# Patient Record
Sex: FEMALE | Race: WHITE | NOT HISPANIC OR LATINO
[De-identification: names, ages, dates, MRNs, and addresses within clinical notes are randomized per-mention and may not be internally consistent; named-entity substitution may affect disease eponyms.]

---

## 2019-01-25 ENCOUNTER — RESULT REVIEW (OUTPATIENT)
Age: 24
End: 2019-01-25

## 2019-05-13 ENCOUNTER — APPOINTMENT (OUTPATIENT)
Dept: DERMATOLOGY | Facility: CLINIC | Age: 24
End: 2019-05-13

## 2020-02-05 ENCOUNTER — RESULT REVIEW (OUTPATIENT)
Age: 25
End: 2020-02-05

## 2020-10-28 ENCOUNTER — OUTPATIENT (OUTPATIENT)
Dept: OUTPATIENT SERVICES | Facility: HOSPITAL | Age: 25
LOS: 1 days | Discharge: HOME | End: 2020-10-28

## 2020-10-28 DIAGNOSIS — Z31.5 ENCOUNTER FOR PROCREATIVE GENETIC COUNSELING: ICD-10-CM

## 2020-11-04 ENCOUNTER — LABORATORY RESULT (OUTPATIENT)
Age: 25
End: 2020-11-04

## 2020-11-04 ENCOUNTER — APPOINTMENT (OUTPATIENT)
Dept: HEMATOLOGY ONCOLOGY | Facility: CLINIC | Age: 25
End: 2020-11-04
Payer: COMMERCIAL

## 2020-11-04 PROCEDURE — 99499A: CUSTOM | Mod: NC

## 2020-11-04 PROCEDURE — 99072 ADDL SUPL MATRL&STAF TM PHE: CPT

## 2020-11-24 ENCOUNTER — APPOINTMENT (OUTPATIENT)
Dept: HEMATOLOGY ONCOLOGY | Facility: CLINIC | Age: 25
End: 2020-11-24

## 2020-12-11 ENCOUNTER — OUTPATIENT (OUTPATIENT)
Dept: OUTPATIENT SERVICES | Facility: HOSPITAL | Age: 25
LOS: 1 days | Discharge: HOME | End: 2020-12-11

## 2020-12-11 DIAGNOSIS — Z31.5 ENCOUNTER FOR PROCREATIVE GENETIC COUNSELING: ICD-10-CM

## 2020-12-16 ENCOUNTER — APPOINTMENT (OUTPATIENT)
Dept: HEMATOLOGY ONCOLOGY | Facility: CLINIC | Age: 25
End: 2020-12-16
Payer: COMMERCIAL

## 2020-12-16 ENCOUNTER — APPOINTMENT (OUTPATIENT)
Dept: HEMATOLOGY ONCOLOGY | Facility: CLINIC | Age: 25
End: 2020-12-16

## 2020-12-16 PROCEDURE — 99205 OFFICE O/P NEW HI 60 MIN: CPT | Mod: 95

## 2020-12-16 PROCEDURE — ZZZZZ: CPT | Mod: NC

## 2021-01-13 ENCOUNTER — APPOINTMENT (OUTPATIENT)
Dept: SURGICAL ONCOLOGY | Facility: CLINIC | Age: 26
End: 2021-01-13
Payer: COMMERCIAL

## 2021-01-13 VITALS
OXYGEN SATURATION: 97 % | DIASTOLIC BLOOD PRESSURE: 72 MMHG | BODY MASS INDEX: 28.35 KG/M2 | SYSTOLIC BLOOD PRESSURE: 108 MMHG | HEART RATE: 64 BPM | HEIGHT: 63 IN | WEIGHT: 160 LBS

## 2021-01-13 DIAGNOSIS — Z78.9 OTHER SPECIFIED HEALTH STATUS: ICD-10-CM

## 2021-01-13 DIAGNOSIS — Z86.59 PERSONAL HISTORY OF OTHER MENTAL AND BEHAVIORAL DISORDERS: ICD-10-CM

## 2021-01-13 PROCEDURE — 99244 OFF/OP CNSLTJ NEW/EST MOD 40: CPT

## 2021-01-13 PROCEDURE — 99072 ADDL SUPL MATRL&STAF TM PHE: CPT

## 2021-01-14 PROBLEM — Z86.59 HISTORY OF ANXIETY: Status: RESOLVED | Noted: 2021-01-13 | Resolved: 2021-01-14

## 2021-01-14 PROBLEM — Z86.59 HISTORY OF DEPRESSION: Status: RESOLVED | Noted: 2021-01-13 | Resolved: 2021-01-14

## 2021-01-14 PROBLEM — Z78.9 NON-SMOKER: Status: ACTIVE | Noted: 2021-01-13

## 2021-01-14 PROBLEM — Z78.9 RARELY CONSUMES ALCOHOL: Status: ACTIVE | Noted: 2021-01-13

## 2021-01-14 NOTE — ASSESSMENT
[FreeTextEntry1] : IMP.\par No evidence of new or recurrent lesions.\par Breast imaging failed to identify any suspicious lesions.\par No suspicious lesions on exam.\par The patient and I discussed the surgical management of breast cancer. I explained that breast cancer can be treated with 2 main surgical approaches. One is breast conserving therapy. The other is mastectomy with or without immediate reconstruction by a plastic surgeon. Breast conserving therapy involves wide excision of the involved area. Negative margins must be achieved with this wide excision. In addition, evaluation of the lymph nodes either with a sentinel lymph node biopsy or an axillary lymph node dissection may be required. This treatment usually requires postoperative radiation to the breast. Treatment with mastectomy also involves evaluation of the lymph node with a sentinel lymph node biopsy or axillary lymph node dissection. Post operative radiation therapy may be needed even after mastectomy in certain advanced cases. \par \par Plan:\par Pt with BRCA 2 gene considering prophylactic mastectomy without breast reconstruction. Will refer Pt over to Dr. Crain for reconstruction. \par \par

## 2021-01-14 NOTE — ADDENDUM
[FreeTextEntry1] : I, Edd Blair, acted solely as a scribe for Dr. Jerrod Osuna on this date 1/13/2021

## 2021-01-14 NOTE — HISTORY OF PRESENT ILLNESS
[de-identified] : Pt is a 24 y/o F presenting today as an initial consultation. \par \par Her most recent genetic test completed on 12/16/20 results as BRCA2 positive. \par \par She has FHx of breast cancer.  \par  \par She has no PMHx of ETOH/Tobacco usage and no present medical conditions . She is currently taking trazodone and citalopram. \par

## 2021-01-14 NOTE — PHYSICAL EXAM
[Normal] : supple, no neck mass and thyroid not enlarged [Normal Neck Lymph Nodes] : normal neck lymph nodes  [Normal Supraclavicular Lymph Nodes] : normal supraclavicular lymph nodes [Normal Groin Lymph Nodes] : normal groin lymph nodes [Normal Axillary Lymph Nodes] : normal axillary lymph nodes [Normal] : oriented to person, place and time, with appropriate affect [FreeTextEntry1] : KN present during exam \par COVID -19 precautions as per [facility] policy was universally followed.  [de-identified] : S1, S2 - Normal rhythm  [de-identified] : Complete normal breast examination performed supine and upright revealed no palpable masses, nipple discharge, inversion, deviation, or enlarge axillary lymph nodes, or supraclavicular lymph nodes.  [de-identified] : Clear breath sounds bilaterally, normal resp. rate

## 2021-01-21 ENCOUNTER — APPOINTMENT (OUTPATIENT)
Dept: PLASTIC SURGERY | Facility: CLINIC | Age: 26
End: 2021-01-21
Payer: COMMERCIAL

## 2021-01-21 VITALS
HEIGHT: 63 IN | TEMPERATURE: 98.7 F | WEIGHT: 160 LBS | OXYGEN SATURATION: 96 % | SYSTOLIC BLOOD PRESSURE: 118 MMHG | BODY MASS INDEX: 28.35 KG/M2 | DIASTOLIC BLOOD PRESSURE: 77 MMHG | HEART RATE: 85 BPM

## 2021-01-21 PROCEDURE — 99244 OFF/OP CNSLTJ NEW/EST MOD 40: CPT

## 2021-01-21 PROCEDURE — 99072 ADDL SUPL MATRL&STAF TM PHE: CPT

## 2021-02-10 ENCOUNTER — RESULT REVIEW (OUTPATIENT)
Age: 26
End: 2021-02-10

## 2021-02-11 NOTE — REASON FOR VISIT
[Initial Evaluation] : an initial evaluation [Family Member] : family member [FreeTextEntry1] : Dr. Jerrod Osuna (Surgical Oncology)

## 2021-02-11 NOTE — CONSULT LETTER
[Dear  ___] : Dear  [unfilled], [Consult Letter:] : I had the pleasure of evaluating your patient, [unfilled]. [Please see my note below.] : Please see my note below. [Consult Closing:] : Thank you very much for allowing me to participate in the care of this patient.  If you have any questions, please do not hesitate to contact me. [Sincerely,] : Sincerely, [FreeTextEntry3] : Praveen Crain MD

## 2021-02-18 ENCOUNTER — APPOINTMENT (OUTPATIENT)
Dept: PLASTIC SURGERY | Facility: CLINIC | Age: 26
End: 2021-02-18
Payer: COMMERCIAL

## 2021-02-18 VITALS
HEART RATE: 77 BPM | OXYGEN SATURATION: 96 % | WEIGHT: 170 LBS | BODY MASS INDEX: 30.12 KG/M2 | SYSTOLIC BLOOD PRESSURE: 104 MMHG | HEIGHT: 63 IN | DIASTOLIC BLOOD PRESSURE: 62 MMHG

## 2021-02-18 PROCEDURE — 99212 OFFICE O/P EST SF 10 MIN: CPT

## 2021-02-18 PROCEDURE — XXXXX: CPT

## 2021-02-22 ENCOUNTER — NON-APPOINTMENT (OUTPATIENT)
Age: 26
End: 2021-02-22

## 2021-02-23 ENCOUNTER — OUTPATIENT (OUTPATIENT)
Dept: OUTPATIENT SERVICES | Facility: HOSPITAL | Age: 26
LOS: 1 days | End: 2021-02-23

## 2021-02-23 VITALS
OXYGEN SATURATION: 98 % | SYSTOLIC BLOOD PRESSURE: 110 MMHG | DIASTOLIC BLOOD PRESSURE: 80 MMHG | RESPIRATION RATE: 16 BRPM | HEIGHT: 63 IN | WEIGHT: 173.94 LBS | TEMPERATURE: 99 F | HEART RATE: 76 BPM

## 2021-02-23 DIAGNOSIS — Z92.89 PERSONAL HISTORY OF OTHER MEDICAL TREATMENT: Chronic | ICD-10-CM

## 2021-02-23 DIAGNOSIS — F41.9 ANXIETY DISORDER, UNSPECIFIED: ICD-10-CM

## 2021-02-23 DIAGNOSIS — Z15.01 GENETIC SUSCEPTIBILITY TO MALIGNANT NEOPLASM OF BREAST: ICD-10-CM

## 2021-02-23 LAB
BLD GP AB SCN SERPL QL: NEGATIVE — SIGNIFICANT CHANGE UP
HCG UR QL: NEGATIVE — SIGNIFICANT CHANGE UP
HCT VFR BLD CALC: 42 % — SIGNIFICANT CHANGE UP (ref 34.5–45)
HGB BLD-MCNC: 13.4 G/DL — SIGNIFICANT CHANGE UP (ref 11.5–15.5)
MCHC RBC-ENTMCNC: 26.7 PG — LOW (ref 27–34)
MCHC RBC-ENTMCNC: 31.9 GM/DL — LOW (ref 32–36)
MCV RBC AUTO: 83.8 FL — SIGNIFICANT CHANGE UP (ref 80–100)
NRBC # BLD: 0 /100 WBCS — SIGNIFICANT CHANGE UP
NRBC # FLD: 0 K/UL — SIGNIFICANT CHANGE UP
PLATELET # BLD AUTO: 362 K/UL — SIGNIFICANT CHANGE UP (ref 150–400)
RBC # BLD: 5.01 M/UL — SIGNIFICANT CHANGE UP (ref 3.8–5.2)
RBC # FLD: 13.2 % — SIGNIFICANT CHANGE UP (ref 10.3–14.5)
RH IG SCN BLD-IMP: POSITIVE — SIGNIFICANT CHANGE UP
WBC # BLD: 7.97 K/UL — SIGNIFICANT CHANGE UP (ref 3.8–10.5)
WBC # FLD AUTO: 7.97 K/UL — SIGNIFICANT CHANGE UP (ref 3.8–10.5)

## 2021-02-23 RX ORDER — SODIUM CHLORIDE 9 MG/ML
1000 INJECTION, SOLUTION INTRAVENOUS
Refills: 0 | Status: DISCONTINUED | OUTPATIENT
Start: 2021-03-01 | End: 2021-03-01

## 2021-02-23 NOTE — H&P PST ADULT - NSANTHOSAYNRD_GEN_A_CORE
No. MELLISA screening performed.  STOP BANG Legend: 0-2 = LOW Risk; 3-4 = INTERMEDIATE Risk; 5-8 = HIGH Risk

## 2021-02-23 NOTE — H&P PST ADULT - SKIN/BREAST COMMENTS
Pt is BRCA positive and mother passed from breast ca - pt to have mastectomy for preventative purposes Pt is BRCA positive and mother   from breast ca - pt to have mastectomy for preventative purposes

## 2021-02-23 NOTE — H&P PST ADULT - NSICDXPROBLEM_GEN_ALL_CORE_FT
PROBLEM DIAGNOSES  Problem: Genetic susceptibility to breast cancer  Assessment and Plan: Pt scheduled for surgery and preop instructions including instructions for taking Famotidine and for Chlorhexidine use in showering on the day of surgery, given verbally and with use of  written materials, and patient confirming understanding of such instructions using  teach back   method.      Problem: Anxiety  Assessment and Plan: Pt to take Citalopram am DOS

## 2021-02-23 NOTE — H&P PST ADULT - MALLAMPATI CLASS
with phonation with phonation/Class III - visualization of the soft palate and the base of the uvula

## 2021-02-23 NOTE — H&P PST ADULT - HISTORY OF PRESENT ILLNESS
Reason for Disposition   Message left on unidentified answering machine.  Answering service notified.    Protocols used: ST NO CONTACT OR DUPLICATE CONTACT CALL-A-AH       Pt is a 26 yr old female scheduled for Bilateral Nipple Sparing Mastectomy with Dr Osuna and Reconstruction of B/L Breast Wounds with poss Local Flap with Dr Crain tentatively 3/1/21 - pt tested positive for BRCA and mother passed from breast ca. Pt now to have preventative surgery.   Pt denies COVID or recent travel   Pt to have COVID preop test  Pt is a 26 yr old female scheduled for Bilateral Nipple Sparing Mastectomy with Dr Osuna and Reconstruction of B/L Breast Wounds with poss Local Flap with Dr Crain tentatively 3/1/21 - pt tested positive for BRCA and mother  from breast ca. Pt now to have preventative surgery.   Pt denies COVID or recent travel   Pt to have COVID preop test

## 2021-02-26 ENCOUNTER — APPOINTMENT (OUTPATIENT)
Dept: DISASTER EMERGENCY | Facility: CLINIC | Age: 26
End: 2021-02-26

## 2021-02-26 ENCOUNTER — TRANSCRIPTION ENCOUNTER (OUTPATIENT)
Age: 26
End: 2021-02-26

## 2021-02-26 NOTE — ASU PATIENT PROFILE, ADULT - VISION (WITH CORRECTIVE LENSES IF THE PATIENT USUALLY WEARS THEM):
Alert-The patient is alert, awake and responds to voice. The patient is oriented to time, place, and person. The triage nurse is able to obtain subjective information.
wears glasses/Partially impaired: cannot see medication labels or newsprint, but can see obstacles in path, and the surrounding layout; can count fingers at arm's length

## 2021-02-27 LAB — SARS-COV-2 N GENE NPH QL NAA+PROBE: NOT DETECTED

## 2021-02-28 ENCOUNTER — TRANSCRIPTION ENCOUNTER (OUTPATIENT)
Age: 26
End: 2021-02-28

## 2021-03-01 ENCOUNTER — APPOINTMENT (OUTPATIENT)
Dept: PLASTIC SURGERY | Facility: HOSPITAL | Age: 26
End: 2021-03-01
Payer: COMMERCIAL

## 2021-03-01 ENCOUNTER — RESULT REVIEW (OUTPATIENT)
Age: 26
End: 2021-03-01

## 2021-03-01 ENCOUNTER — INPATIENT (INPATIENT)
Facility: HOSPITAL | Age: 26
LOS: 0 days | Discharge: HOME | End: 2021-03-02
Attending: SURGERY | Admitting: SURGERY
Payer: COMMERCIAL

## 2021-03-01 ENCOUNTER — APPOINTMENT (OUTPATIENT)
Dept: SURGICAL ONCOLOGY | Facility: HOSPITAL | Age: 26
End: 2021-03-01

## 2021-03-01 VITALS
SYSTOLIC BLOOD PRESSURE: 112 MMHG | HEIGHT: 63 IN | WEIGHT: 173.94 LBS | OXYGEN SATURATION: 96 % | RESPIRATION RATE: 16 BRPM | TEMPERATURE: 98 F | HEART RATE: 76 BPM | DIASTOLIC BLOOD PRESSURE: 69 MMHG

## 2021-03-01 DIAGNOSIS — Z92.89 PERSONAL HISTORY OF OTHER MEDICAL TREATMENT: Chronic | ICD-10-CM

## 2021-03-01 DIAGNOSIS — Z15.01 GENETIC SUSCEPTIBILITY TO MALIGNANT NEOPLASM OF BREAST: ICD-10-CM

## 2021-03-01 LAB — HCG UR QL: NEGATIVE — SIGNIFICANT CHANGE UP

## 2021-03-01 PROCEDURE — 88305 TISSUE EXAM BY PATHOLOGIST: CPT | Mod: 26

## 2021-03-01 PROCEDURE — 88307 TISSUE EXAM BY PATHOLOGIST: CPT | Mod: 26

## 2021-03-01 PROCEDURE — 19499 UNLISTED PROCEDURE BREAST: CPT

## 2021-03-01 PROCEDURE — 15877 SUCTION LIPECTOMY TRUNK: CPT

## 2021-03-01 RX ORDER — SODIUM CHLORIDE 9 MG/ML
500 INJECTION, SOLUTION INTRAVENOUS ONCE
Refills: 0 | Status: COMPLETED | OUTPATIENT
Start: 2021-03-01 | End: 2021-03-01

## 2021-03-01 RX ORDER — HEPARIN SODIUM 5000 [USP'U]/ML
5000 INJECTION INTRAVENOUS; SUBCUTANEOUS EVERY 8 HOURS
Refills: 0 | Status: DISCONTINUED | OUTPATIENT
Start: 2021-03-01 | End: 2021-03-02

## 2021-03-01 RX ORDER — LANOLIN ALCOHOL/MO/W.PET/CERES
9 CREAM (GRAM) TOPICAL AT BEDTIME
Refills: 0 | Status: DISCONTINUED | OUTPATIENT
Start: 2021-03-01 | End: 2021-03-02

## 2021-03-01 RX ORDER — CEFAZOLIN SODIUM 1 G
2000 VIAL (EA) INJECTION EVERY 8 HOURS
Refills: 0 | Status: DISCONTINUED | OUTPATIENT
Start: 2021-03-01 | End: 2021-03-02

## 2021-03-01 RX ORDER — ACETAMINOPHEN 500 MG
975 TABLET ORAL EVERY 6 HOURS
Refills: 0 | Status: DISCONTINUED | OUTPATIENT
Start: 2021-03-01 | End: 2021-03-02

## 2021-03-01 RX ORDER — OXYCODONE HYDROCHLORIDE 5 MG/1
5 TABLET ORAL EVERY 6 HOURS
Refills: 0 | Status: DISCONTINUED | OUTPATIENT
Start: 2021-03-01 | End: 2021-03-02

## 2021-03-01 RX ORDER — HYDROMORPHONE HYDROCHLORIDE 2 MG/ML
0.5 INJECTION INTRAMUSCULAR; INTRAVENOUS; SUBCUTANEOUS
Refills: 0 | Status: DISCONTINUED | OUTPATIENT
Start: 2021-03-01 | End: 2021-03-01

## 2021-03-01 RX ORDER — HYDROMORPHONE HYDROCHLORIDE 2 MG/ML
1 INJECTION INTRAMUSCULAR; INTRAVENOUS; SUBCUTANEOUS
Refills: 0 | Status: DISCONTINUED | OUTPATIENT
Start: 2021-03-01 | End: 2021-03-01

## 2021-03-01 RX ORDER — CITALOPRAM 10 MG/1
20 TABLET, FILM COATED ORAL DAILY
Refills: 0 | Status: DISCONTINUED | OUTPATIENT
Start: 2021-03-01 | End: 2021-03-02

## 2021-03-01 RX ORDER — KETOROLAC TROMETHAMINE 30 MG/ML
15 SYRINGE (ML) INJECTION EVERY 6 HOURS
Refills: 0 | Status: DISCONTINUED | OUTPATIENT
Start: 2021-03-01 | End: 2021-03-02

## 2021-03-01 RX ORDER — TRAZODONE HCL 50 MG
150 TABLET ORAL AT BEDTIME
Refills: 0 | Status: DISCONTINUED | OUTPATIENT
Start: 2021-03-01 | End: 2021-03-02

## 2021-03-01 RX ORDER — ONDANSETRON 8 MG/1
4 TABLET, FILM COATED ORAL EVERY 6 HOURS
Refills: 0 | Status: DISCONTINUED | OUTPATIENT
Start: 2021-03-01 | End: 2021-03-02

## 2021-03-01 RX ADMIN — ONDANSETRON 4 MILLIGRAM(S): 8 TABLET, FILM COATED ORAL at 14:25

## 2021-03-01 RX ADMIN — Medication 150 MILLIGRAM(S): at 22:21

## 2021-03-01 RX ADMIN — SODIUM CHLORIDE 1500 MILLILITER(S): 9 INJECTION, SOLUTION INTRAVENOUS at 13:10

## 2021-03-01 RX ADMIN — HEPARIN SODIUM 5000 UNIT(S): 5000 INJECTION INTRAVENOUS; SUBCUTANEOUS at 18:13

## 2021-03-01 RX ADMIN — ONDANSETRON 4 MILLIGRAM(S): 8 TABLET, FILM COATED ORAL at 22:21

## 2021-03-01 RX ADMIN — Medication 975 MILLIGRAM(S): at 22:22

## 2021-03-01 RX ADMIN — Medication 975 MILLIGRAM(S): at 16:12

## 2021-03-01 RX ADMIN — SODIUM CHLORIDE 30 MILLILITER(S): 9 INJECTION, SOLUTION INTRAVENOUS at 14:15

## 2021-03-01 RX ADMIN — Medication 100 MILLIGRAM(S): at 22:22

## 2021-03-01 RX ADMIN — Medication 100 MILLIGRAM(S): at 16:09

## 2021-03-01 RX ADMIN — Medication 9 MILLIGRAM(S): at 22:21

## 2021-03-01 NOTE — PROGRESS NOTE ADULT - SUBJECTIVE AND OBJECTIVE BOX
D Team Surgery Progress Note      Procedure: Bilateral mastectomy, liposuction, free nipple graft with chest masculinization  Surgeon: Dr Osuna          Vital Signs Last 24 Hrs  T(C): 37.1 (01 Mar 2021 15:19), Max: 37.1 (01 Mar 2021 14:00)  T(F): 98.8 (01 Mar 2021 15:19), Max: 98.8 (01 Mar 2021 15:19)  HR: 106 (01 Mar 2021 15:19) (76 - 121)  BP: 112/65 (01 Mar 2021 15:19) (99/71 - 138/64)  BP(mean): 82 (01 Mar 2021 14:00) (72 - 90)  RR: 16 (01 Mar 2021 15:19) (16 - 25)  SpO2: 97% (01 Mar 2021 15:19) (95% - 98%)  Jeb:  BINHT:  I&O's Summary    01 Mar 2021 07:01  -  01 Mar 2021 16:15  --------------------------------------------------------  IN: 810 mL / OUT: 335 mL / NET: 475 mL      I&O's Detail    01 Mar 2021 07:01  -  01 Mar 2021 16:15  --------------------------------------------------------  IN:    Lactated Ringers: 90 mL    Lactated Ringers Bolus: 500 mL    Oral Fluid: 220 mL  Total IN: 810 mL    OUT:    Bulb (mL): 0 mL    Bulb (mL): 10 mL    Indwelling Catheter - Urethral (mL): 325 mL  Total OUT: 335 mL    Total NET: 475 mL          PHYSICAL EXAM:  Constitutional: NAD, resting comfortably in bed, easily arousable  Resp: non labored breasting  Chest: ACE wrap in place, dressing C/D/I, min bruising noted, no hematoma, x2 angie drains in place w/SS output                  LABS:        ASSESSMENT  25 yo s/p Bilateral subcutaneous mastectomy, liposuction, free nipple graft with chest masculinization, now recovering well on the floor    PLAN  diet as tolerated  pain controlled  gonzalez, likely remove in am   angie drain x2 to bulb suction  abx x2 days per Plastics           D Team Surgery Progress Note      Procedure: Bilateral mastectomy, liposuction, free nipple graft with chest masculinization  Surgeon: Dr Osuna    Pt seen and examined at bedside. Pt states pain controlled with pain meds. Pt noted to have nausea that is improving    Vital Signs Last 24 Hrs  T(C): 37.1 (01 Mar 2021 15:19), Max: 37.1 (01 Mar 2021 14:00)  T(F): 98.8 (01 Mar 2021 15:19), Max: 98.8 (01 Mar 2021 15:19)  HR: 106 (01 Mar 2021 15:19) (76 - 121)  BP: 112/65 (01 Mar 2021 15:19) (99/71 - 138/64)  BP(mean): 82 (01 Mar 2021 14:00) (72 - 90)  RR: 16 (01 Mar 2021 15:19) (16 - 25)  SpO2: 97% (01 Mar 2021 15:19) (95% - 98%)  Gonzalez:  NGT:  I&O's Summary    01 Mar 2021 07:01  -  01 Mar 2021 16:15  --------------------------------------------------------  IN: 810 mL / OUT: 335 mL / NET: 475 mL      I&O's Detail    01 Mar 2021 07:01  -  01 Mar 2021 16:15  --------------------------------------------------------  IN:    Lactated Ringers: 90 mL    Lactated Ringers Bolus: 500 mL    Oral Fluid: 220 mL  Total IN: 810 mL    OUT:    Bulb (mL): 0 mL    Bulb (mL): 10 mL    Indwelling Catheter - Urethral (mL): 325 mL  Total OUT: 335 mL    Total NET: 475 mL          PHYSICAL EXAM:  Constitutional: NAD, resting comfortably in bed, easily arousable  Resp: non labored breasting  Chest: ACE wrap in place, dressing C/D/I, min bruising noted, no hematoma, x2 angie drains in place w/SS output                LABS:        ASSESSMENT  27 yo s/p Bilateral subcutaneous mastectomy, liposuction, free nipple graft with chest masculinization, now recovering well on the floor    PLAN  diet as tolerated  pain controlled  gonzalez, likely remove in am   angie drain x2 to bulb suction  abx x2 days per Plastics  monitor HR, if cont tachycardia will obtain CBC

## 2021-03-01 NOTE — BRIEF OPERATIVE NOTE - NSICDXBRIEFPOSTOP_GEN_ALL_CORE_FT
POST-OP DIAGNOSIS:  Genetic predisposition to breast cancer 01-Mar-2021 09:20:50  Netta Uribe  
POST-OP DIAGNOSIS:  Genetic predisposition to breast cancer 01-Mar-2021 09:20:50  Netta Uribe

## 2021-03-01 NOTE — BRIEF OPERATIVE NOTE - NSICDXBRIEFPROCEDURE_GEN_ALL_CORE_FT
PROCEDURES:  Bilateral simple mastectomy 01-Mar-2021 09:21:07  Netta Uribe  
PROCEDURES:  Free nipple graft of both breasts 01-Mar-2021 11:09:29  Bella Pérez  Bilateral simple mastectomy 01-Mar-2021 09:21:07  Netta Uribe

## 2021-03-01 NOTE — BRIEF OPERATIVE NOTE - OPERATION/FINDINGS
Bilateral simple mastectomies, uncomplicated. Left in OR with Plastic Surgery for reconstruction with free nipple graft
Bilateral subcutaneous mastectomy, liposuction, free nipple graft with chest masculinization

## 2021-03-01 NOTE — BRIEF OPERATIVE NOTE - NSICDXBRIEFPREOP_GEN_ALL_CORE_FT
PRE-OP DIAGNOSIS:  Genetic predisposition to breast cancer 01-Mar-2021 09:20:45  Netta Uribe  
PRE-OP DIAGNOSIS:  Genetic predisposition to breast cancer 01-Mar-2021 09:20:45  Netta Uribe

## 2021-03-02 ENCOUNTER — TRANSCRIPTION ENCOUNTER (OUTPATIENT)
Age: 26
End: 2021-03-02

## 2021-03-02 VITALS
SYSTOLIC BLOOD PRESSURE: 110 MMHG | OXYGEN SATURATION: 97 % | DIASTOLIC BLOOD PRESSURE: 72 MMHG | TEMPERATURE: 99 F | HEART RATE: 106 BPM | RESPIRATION RATE: 20 BRPM

## 2021-03-02 PROBLEM — F41.9 ANXIETY DISORDER, UNSPECIFIED: Chronic | Status: ACTIVE | Noted: 2021-02-23

## 2021-03-02 PROBLEM — Z15.01 GENETIC SUSCEPTIBILITY TO MALIGNANT NEOPLASM OF BREAST: Chronic | Status: ACTIVE | Noted: 2021-02-23

## 2021-03-02 PROBLEM — E66.9 OBESITY, UNSPECIFIED: Chronic | Status: ACTIVE | Noted: 2021-02-23

## 2021-03-02 LAB
ANION GAP SERPL CALC-SCNC: 10 MMOL/L — SIGNIFICANT CHANGE UP (ref 7–14)
BUN SERPL-MCNC: 12 MG/DL — SIGNIFICANT CHANGE UP (ref 7–23)
CALCIUM SERPL-MCNC: 8.7 MG/DL — SIGNIFICANT CHANGE UP (ref 8.4–10.5)
CHLORIDE SERPL-SCNC: 105 MMOL/L — SIGNIFICANT CHANGE UP (ref 98–107)
CO2 SERPL-SCNC: 25 MMOL/L — SIGNIFICANT CHANGE UP (ref 22–31)
CREAT SERPL-MCNC: 0.64 MG/DL — SIGNIFICANT CHANGE UP (ref 0.5–1.3)
GLUCOSE SERPL-MCNC: 115 MG/DL — HIGH (ref 70–99)
HCT VFR BLD CALC: 36.6 % — SIGNIFICANT CHANGE UP (ref 34.5–45)
HGB BLD-MCNC: 12 G/DL — SIGNIFICANT CHANGE UP (ref 11.5–15.5)
MAGNESIUM SERPL-MCNC: 2.1 MG/DL — SIGNIFICANT CHANGE UP (ref 1.6–2.6)
MCHC RBC-ENTMCNC: 27.3 PG — SIGNIFICANT CHANGE UP (ref 27–34)
MCHC RBC-ENTMCNC: 32.8 GM/DL — SIGNIFICANT CHANGE UP (ref 32–36)
MCV RBC AUTO: 83.4 FL — SIGNIFICANT CHANGE UP (ref 80–100)
NRBC # BLD: 0 /100 WBCS — SIGNIFICANT CHANGE UP
NRBC # FLD: 0 K/UL — SIGNIFICANT CHANGE UP
PHOSPHATE SERPL-MCNC: 3 MG/DL — SIGNIFICANT CHANGE UP (ref 2.5–4.5)
PLATELET # BLD AUTO: 303 K/UL — SIGNIFICANT CHANGE UP (ref 150–400)
POTASSIUM SERPL-MCNC: 4.1 MMOL/L — SIGNIFICANT CHANGE UP (ref 3.5–5.3)
POTASSIUM SERPL-SCNC: 4.1 MMOL/L — SIGNIFICANT CHANGE UP (ref 3.5–5.3)
RBC # BLD: 4.39 M/UL — SIGNIFICANT CHANGE UP (ref 3.8–5.2)
RBC # FLD: 13.2 % — SIGNIFICANT CHANGE UP (ref 10.3–14.5)
SODIUM SERPL-SCNC: 140 MMOL/L — SIGNIFICANT CHANGE UP (ref 135–145)
WBC # BLD: 12.07 K/UL — HIGH (ref 3.8–10.5)
WBC # FLD AUTO: 12.07 K/UL — HIGH (ref 3.8–10.5)

## 2021-03-02 RX ORDER — OXYCODONE HYDROCHLORIDE 5 MG/1
1 TABLET ORAL
Qty: 8 | Refills: 0
Start: 2021-03-02 | End: 2021-03-03

## 2021-03-02 RX ORDER — ACETAMINOPHEN 500 MG
3 TABLET ORAL
Qty: 0 | Refills: 0 | DISCHARGE
Start: 2021-03-02

## 2021-03-02 RX ADMIN — Medication 100 MILLIGRAM(S): at 05:51

## 2021-03-02 RX ADMIN — Medication 975 MILLIGRAM(S): at 05:51

## 2021-03-02 RX ADMIN — CITALOPRAM 20 MILLIGRAM(S): 10 TABLET, FILM COATED ORAL at 09:29

## 2021-03-02 RX ADMIN — Medication 15 MILLIGRAM(S): at 03:03

## 2021-03-02 RX ADMIN — HEPARIN SODIUM 5000 UNIT(S): 5000 INJECTION INTRAVENOUS; SUBCUTANEOUS at 10:21

## 2021-03-02 RX ADMIN — Medication 975 MILLIGRAM(S): at 13:03

## 2021-03-02 RX ADMIN — Medication 100 MILLIGRAM(S): at 13:03

## 2021-03-02 NOTE — PROGRESS NOTE ADULT - ASSESSMENT
ASSESSMENT/PLAN:   VINNY RANGEL is a 26yFemale s/p bilateral mastectomy.    - Tylenol, IV toradol and oxycodone prn for pain control  - Activity as tolerated  - Continue DVT prophylaxis  - D/c on 7 days of Duricef  - Remaining care per primary    Plastic Surgery   [facility]: [id number]  [facility] [place]: [phone]

## 2021-03-02 NOTE — DISCHARGE NOTE PROVIDER - PROVIDER TOKENS
PROVIDER:[TOKEN:[[id number]],FOLLOWUP:[1 week]],PROVIDER:[TOKEN:[[id number]],FOLLOWUP:[1 week]]

## 2021-03-02 NOTE — DISCHARGE NOTE PROVIDER - CARE PROVIDERS DIRECT ADDRESSES
[email or web address] Ear Star Wedge Flap Text: The defect edges were debeveled with a #15 blade scalpel.  Given the location of the defect and the proximity to free margins (helical rim) an ear star wedge flap was deemed most appropriate.  Using a sterile surgical marker, the appropriate flap was drawn incorporating the defect and placing the expected incisions between the helical rim and antihelix where possible.  The area thus outlined was incised through and through with a #15 scalpel blade.

## 2021-03-02 NOTE — PROGRESS NOTE ADULT - ATTENDING COMMENTS
Pt seen and examined. Agree with above  Pain under control  No significant events overnight    Chest - dressing in place, angie serosang    Plan    -Ok to d/c home  -Keep bandage in place  -Keep dry  -Oral abx x 7days  -ANGIE teaching  -Follow up next week at [phone]

## 2021-03-02 NOTE — DISCHARGE NOTE NURSING/CASE MANAGEMENT/SOCIAL WORK - PATIENT PORTAL LINK FT
You can access the FollowMyHealth Patient Portal offered by [facility] by registering at the following website: [email or web address]. By joining [employer]’s FollowMyHealth portal, you will also be able to view your health information using other applications (apps) compatible with our system.

## 2021-03-02 NOTE — DISCHARGE NOTE NURSING/CASE MANAGEMENT/SOCIAL WORK - NSDCPECAREGIVERED_GEN_ALL_CORE
call md for follow up appt. call md for sign of infection (temp greater than 100.4f, redness at incision, pain not relieved by meds). drink 9-13 eight oz. glasses of fluid daily. call md for follow up appt. call md for sign of infection (temp greater than 100.4f, redness at incision, pain not relieved by meds). drink 9-13 eight oz. glasses of fluid daily./No

## 2021-03-02 NOTE — DISCHARGE NOTE NURSING/CASE MANAGEMENT/SOCIAL WORK - NSDCPNINST_GEN_ALL_CORE
pt ambulating, eating, voiding without difficulty. iv discontinued. no distress noted. patient has bautista breast dressings with ace wrap, intact and dry. angie drain care given and patient emptied bautista breast dressings on her own.

## 2021-03-02 NOTE — DISCHARGE NOTE PROVIDER - NSDCMRMEDTOKEN_GEN_ALL_CORE_FT
acetaminophen 325 mg oral tablet: 3 tab(s) orally every 6 hours  citalopram 20 mg oral tablet: 1 tab(s) orally once a day  Junel 1/20 oral tablet: 1 tab(s) orally once a day  Melatonin 10 mg oral capsule: 1 cap(s) orally once a day (at bedtime)  oxyCODONE 5 mg oral tablet: 1 tab(s) orally every 6 hours, As needed, Severe Pain (7 - 10) MDD:4  traZODone 150 mg oral tablet: 1 tab(s) orally 1 times a dayHS

## 2021-03-02 NOTE — DISCHARGE NOTE PROVIDER - NSDCCPCAREPLAN_GEN_ALL_CORE_FT
PRINCIPAL DISCHARGE DIAGNOSIS  Diagnosis: Genetic predisposition to breast cancer  Assessment and Plan of Treatment: WOUND CARE:  Please keep incisions clean and dry. Please do not Scrub or rub incisions. Do not use lotion or powder on incisions.   DRAINS: Please empty and measure the drain as you have been taught daily. Please record output and bring to your follow up appointment.  BATHING: You may shower and/or sponge bathe. You may use warm soapy water in the shower and rinse, pat dry.  ACTIVITY: No heavy lifting or straining. Otherwise, you may return to your usual level of physical activity. If you are taking narcotic pain medication DO NOT drive a car, operate machinery or make important decisions.  DIET: Return to your usual diet.  NOTIFY YOUR SURGEON IF: You have any bleeding that does not stop, any pus draining from your wound(s), increased pain at surgical site, any fever (over 100.4 F) persistent nausea/vomiting, or if your pain is not controlled on your discharge pain medications.  Please follow up with your primary care physician in 1-2 weeks regarding your hospitalization.  Please follow up with your surgeon, Dr. Osuna in 1 week. Please call office to make an appointment   Please follow up with your Plastic Surgeon, Dr. Crain, Please call office to make an appointment.

## 2021-03-02 NOTE — DISCHARGE NOTE PROVIDER - CARE PROVIDER_API CALL
Jerrod Osuna)  Surgery  [address] [place], [place] [id number]  Phone: [phone]  Fax: [phone]  Follow Up Time: 1 week    Praveen Crain)  ColonRectal Surgery; Plastic Surgery; Surgery; Surgery of the Hand  [address] [facility] [address]  [place] [address]  Phone: [phone]  Fax: [phone]  Follow Up Time: 1 week

## 2021-03-02 NOTE — DISCHARGE NOTE PROVIDER - NSDCCPTREATMENT_GEN_ALL_CORE_FT
PRINCIPAL PROCEDURE  Procedure: Bilateral simple mastectomy  Findings and Treatment:       SECONDARY PROCEDURE  Procedure: Free nipple graft of both breasts  Findings and Treatment:

## 2021-03-02 NOTE — PROGRESS NOTE ADULT - SUBJECTIVE AND OBJECTIVE BOX
Plastic Surgery Progress Note (pg [facility]: [id number], [facility]: [phone])    SUBJECTIVE  The patient was seen and examined. No acute events overnight.    OBJECTIVE  ___________________________________________________  VITAL SIGNS / I&O's   Vital Signs Last 24 Hrs  T(C): 36.8 (02 Mar 2021 05:49), Max: 37.2 (02 Mar 2021 02:38)  T(F): 98.2 (02 Mar 2021 05:49), Max: 99 (02 Mar 2021 02:38)  HR: 84 (02 Mar 2021 05:49) (84 - 121)  BP: 96/52 (02 Mar 2021 05:49) (96/52 - 138/64)  BP(mean): 82 (01 Mar 2021 14:00) (72 - 90)  RR: 18 (02 Mar 2021 05:49) (16 - 25)  SpO2: 98% (02 Mar 2021 05:49) (95% - 98%)      01 Mar 2021 07:01  -  02 Mar 2021 07:00  --------------------------------------------------------  IN:    IV PiggyBack: 100 mL    Lactated Ringers: 90 mL    Lactated Ringers Bolus: 500 mL    Oral Fluid: 220 mL  Total IN: 910 mL    OUT:    Bulb (mL): 12 mL    Bulb (mL): 31 mL    Indwelling Catheter - Urethral (mL): 2775 mL  Total OUT: 2818 mL    Total NET: -1908 mL        ___________________________________________________  PHYSICAL EXAM    CONSTITUTIONAL: AOx3. NAD.   RIGHT BREAST: Mastectomy skin flap ecchymosis, stable. No collections. Drain(s) serosanguinous.  LEFT BREAST: Mastectomy skin flap ecchymosis, stable. No collections. Drain(s) serosanguinous.    ___________________________________________________  LABS                        12.0   12.07 )-----------( 303      ( 02 Mar 2021 03:31 )             36.6     02 Mar 2021 03:31    140    |  105    |  12     ----------------------------<  115    4.1     |  25     |  0.64     Ca    8.7        02 Mar 2021 03:31  Phos  3.0       02 Mar 2021 03:31  Mg     2.1       02 Mar 2021 03:31        CAPILLARY BLOOD GLUCOSE              ___________________________________________________  MICRO  Recent Cultures:    ___________________________________________________  MEDICATIONS  (STANDING):  acetaminophen   Tablet .. 975 milliGRAM(s) Oral every 6 hours  ceFAZolin   IVPB 2000 milliGRAM(s) IV Intermittent every 8 hours  citalopram 20 milliGRAM(s) Oral daily  heparin   Injectable 5000 Unit(s) SubCutaneous every 8 hours  melatonin 9 milliGRAM(s) Oral at bedtime  traZODone 150 milliGRAM(s) Oral at bedtime    MEDICATIONS  (PRN):  ketorolac   Injectable 15 milliGRAM(s) IV Push every 6 hours PRN Moderate Pain (4 - 6)  ondansetron Injectable 4 milliGRAM(s) IV Push every 6 hours PRN Nausea  oxyCODONE    IR 5 milliGRAM(s) Oral every 6 hours PRN Severe Pain (7 - 10)

## 2021-03-02 NOTE — PROGRESS NOTE ADULT - ASSESSMENT
26yFemale s/p bilateral mastectomy.    - Tylenol, IV toradol and oxycodone prn for pain control  - cont reg diet  - Activity as tolerated  - Continue DVT prophylaxis  - D/c on 7 days of Duricef per Plastics  -dc home this afternoon       D Team Surgery   [id number]

## 2021-03-02 NOTE — PROGRESS NOTE ADULT - SUBJECTIVE AND OBJECTIVE BOX
D Team Surgery Daily Progress Notes    Vital Signs Last 24 Hrs  T(C): 36.8 (02 Mar 2021 05:49), Max: 37.2 (02 Mar 2021 02:38)  T(F): 98.2 (02 Mar 2021 05:49), Max: 99 (02 Mar 2021 02:38)  HR: 84 (02 Mar 2021 05:49) (84 - 121)  BP: 96/52 (02 Mar 2021 05:49) (96/52 - 138/64)  BP(mean): 82 (01 Mar 2021 14:00) (72 - 90)  RR: 18 (02 Mar 2021 05:49) (16 - 25)  SpO2: 98% (02 Mar 2021 05:49) (95% - 98%)      SUBJECTIVE: Patient seen by team on morning rounds. lying comfortably in bed. Denies chest pain, SOB, chest pain, palpitations, N/V/D, fever and chills.    General Appearance: Appears well, NAD  Neck: Supple  Chest: Equal expansion bilaterally, equal breath sounds  RIGHT BREAST: Mastectomy skin flap ecchymosis, stable. No collections. DEBI wit SS op  LEFT BREAST: Mastectomy skin flap ecchymosis, stable. No collections. DEBI with SS op  CV: Pulse regular presently  Abdomen: Soft, nontense, appropriate incisional tenderness, dressings clean and dry and intact  Extremities: Grossly symmetric, SCD's in place     I&O's Summary    01 Mar 2021 07:01  -  02 Mar 2021 07:00  --------------------------------------------------------  IN: 910 mL / OUT: 2818 mL / NET: -1908 mL    02 Mar 2021 07:01  -  02 Mar 2021 09:31  --------------------------------------------------------  IN: 0 mL / OUT: 175 mL / NET: -175 mL      I&O's Detail    01 Mar 2021 07:01  -  02 Mar 2021 07:00  --------------------------------------------------------  IN:    IV PiggyBack: 100 mL    Lactated Ringers: 90 mL    Lactated Ringers Bolus: 500 mL    Oral Fluid: 220 mL  Total IN: 910 mL    OUT:    Bulb (mL): 12 mL    Bulb (mL): 31 mL    Indwelling Catheter - Urethral (mL): 2775 mL  Total OUT: 2818 mL    Total NET: -1908 mL      02 Mar 2021 07:01  -  02 Mar 2021 09:31  --------------------------------------------------------  IN:  Total IN: 0 mL    OUT:    Indwelling Catheter - Urethral (mL): 125 mL    Voided (mL): 50 mL  Total OUT: 175 mL    Total NET: -175 mL          MEDICATIONS  (STANDING):  acetaminophen   Tablet .. 975 milliGRAM(s) Oral every 6 hours  ceFAZolin   IVPB 2000 milliGRAM(s) IV Intermittent every 8 hours  citalopram 20 milliGRAM(s) Oral daily  heparin   Injectable 5000 Unit(s) SubCutaneous every 8 hours  melatonin 9 milliGRAM(s) Oral at bedtime  traZODone 150 milliGRAM(s) Oral at bedtime    MEDICATIONS  (PRN):  ketorolac   Injectable 15 milliGRAM(s) IV Push every 6 hours PRN Moderate Pain (4 - 6)  ondansetron Injectable 4 milliGRAM(s) IV Push every 6 hours PRN Nausea  oxyCODONE    IR 5 milliGRAM(s) Oral every 6 hours PRN Severe Pain (7 - 10)      LABS:                        12.0   12.07 )-----------( 303      ( 02 Mar 2021 03:31 )             36.6     03-02    140  |  105  |  12  ----------------------------<  115<H>  4.1   |  25  |  0.64    Ca    8.7      02 Mar 2021 03:31  Phos  3.0     03-02  Mg     2.1     03-02            RADIOLOGY & ADDITIONAL STUDIES:

## 2021-03-02 NOTE — DISCHARGE NOTE NURSING/CASE MANAGEMENT/SOCIAL WORK - NSDCPNDISPN_GEN_ALL_CORE
Education provided on the pain management plan of care/Side effects of pain management treatment/Activities of daily living, including home environment that might     exacerbate pain or reduce effectiveness of the pain management plan of care as well as strategies to address these issues/Opioids not applicable/not prescribed Education provided on the pain management plan of care/Side effects of pain management treatment/Activities of daily living, including home environment that might     exacerbate pain or reduce effectiveness of the pain management plan of care as well as strategies to address these issues/Safe use, storage and disposal of opioids when prescribed

## 2021-03-08 LAB — SURGICAL PATHOLOGY STUDY: SIGNIFICANT CHANGE UP

## 2021-03-09 ENCOUNTER — APPOINTMENT (OUTPATIENT)
Dept: PLASTIC SURGERY | Facility: CLINIC | Age: 26
End: 2021-03-09
Payer: COMMERCIAL

## 2021-03-09 VITALS
WEIGHT: 170 LBS | HEART RATE: 88 BPM | TEMPERATURE: 98.2 F | BODY MASS INDEX: 30.12 KG/M2 | HEIGHT: 63 IN | DIASTOLIC BLOOD PRESSURE: 76 MMHG | OXYGEN SATURATION: 97 % | SYSTOLIC BLOOD PRESSURE: 118 MMHG

## 2021-03-09 PROCEDURE — 99024 POSTOP FOLLOW-UP VISIT: CPT

## 2021-03-15 ENCOUNTER — APPOINTMENT (OUTPATIENT)
Dept: SURGICAL ONCOLOGY | Facility: CLINIC | Age: 26
End: 2021-03-15
Payer: COMMERCIAL

## 2021-03-15 VITALS
TEMPERATURE: 97.9 F | SYSTOLIC BLOOD PRESSURE: 112 MMHG | HEART RATE: 86 BPM | WEIGHT: 170 LBS | RESPIRATION RATE: 18 BRPM | DIASTOLIC BLOOD PRESSURE: 73 MMHG | HEIGHT: 63 IN | OXYGEN SATURATION: 98 % | BODY MASS INDEX: 30.12 KG/M2

## 2021-03-15 PROCEDURE — 99024 POSTOP FOLLOW-UP VISIT: CPT

## 2021-03-15 NOTE — HISTORY OF PRESENT ILLNESS
[de-identified] : Pt is a 25 y/o F presenting a post-op visit. She is s/p bilateral mastectomy on 3/1/21. She is without c/o. \par \par Pathology (3/1/21): \par 1. Breast, left, retro areolar tissue, excision\par - Breast tissue with stromal fibrosis\par \par 2. Breast, right, retro areolar tissue, excision\par - No tissue in container\par Note: Dr Osuna notified on 03/08/2021.\par \par 3. Breast, left, nipple sparing mastectomy\par - Breast tissue with stromal fibrosis\par - Benign skin\par \par 4. Breast, right, nipple sparing mastectomy\par - Breast tissue with stromal fibrosis\par \par \par Her most recent genetic test completed on 12/16/20 results as BRCA2 positive. \par \par She has FHx of breast cancer.  \par  \par She has no PMHx of ETOH/Tobacco usage and no present medical conditions . She is currently taking trazodone and citalopram. \par

## 2021-03-15 NOTE — PHYSICAL EXAM
[Normal] : supple, no neck mass and thyroid not enlarged [Normal Neck Lymph Nodes] : normal neck lymph nodes  [Normal Supraclavicular Lymph Nodes] : normal supraclavicular lymph nodes [Normal Groin Lymph Nodes] : normal groin lymph nodes [Normal Axillary Lymph Nodes] : normal axillary lymph nodes [Normal] : oriented to person, place and time, with appropriate affect [de-identified] : S1, S2 - Normal rhythm  [de-identified] : Incision healing well. no evidence of infection.  [de-identified] : Clear breath sounds bilaterally, normal resp. rate

## 2021-03-15 NOTE — ASSESSMENT
[FreeTextEntry1] : IMP.\par S/p bilateral mastectomy \par Pathology was benign- no evidence of infection or suspicious lesions. \par Pt will begin treatment therapy \par \par \par \par \par \par PLAN:\par Continue yearly breast surveillance \par \par

## 2021-03-16 ENCOUNTER — APPOINTMENT (OUTPATIENT)
Dept: PLASTIC SURGERY | Facility: CLINIC | Age: 26
End: 2021-03-16
Payer: COMMERCIAL

## 2021-03-16 VITALS
HEART RATE: 81 BPM | TEMPERATURE: 98.5 F | DIASTOLIC BLOOD PRESSURE: 77 MMHG | BODY MASS INDEX: 30.12 KG/M2 | WEIGHT: 170 LBS | HEIGHT: 63 IN | SYSTOLIC BLOOD PRESSURE: 112 MMHG | OXYGEN SATURATION: 97 %

## 2021-03-16 PROCEDURE — XXXXX: CPT

## 2021-03-16 NOTE — HISTORY OF PRESENT ILLNESS
[FreeTextEntry1] : 26 year old patient who presents for a post op visit s/p bilateral prophylactic mastectomy with nipple graft and liposuction  DOS: 03/01/21.pt is doing well.

## 2021-04-01 ENCOUNTER — APPOINTMENT (OUTPATIENT)
Dept: PLASTIC SURGERY | Facility: CLINIC | Age: 26
End: 2021-04-01
Payer: COMMERCIAL

## 2021-04-01 VITALS
OXYGEN SATURATION: 95 % | HEIGHT: 63 IN | WEIGHT: 170 LBS | SYSTOLIC BLOOD PRESSURE: 108 MMHG | HEART RATE: 90 BPM | TEMPERATURE: 98.3 F | BODY MASS INDEX: 30.12 KG/M2 | DIASTOLIC BLOOD PRESSURE: 65 MMHG

## 2021-04-01 PROCEDURE — 99024 POSTOP FOLLOW-UP VISIT: CPT

## 2021-05-06 ENCOUNTER — APPOINTMENT (OUTPATIENT)
Dept: PLASTIC SURGERY | Facility: CLINIC | Age: 26
End: 2021-05-06
Payer: COMMERCIAL

## 2021-05-06 ENCOUNTER — APPOINTMENT (OUTPATIENT)
Dept: PLASTIC SURGERY | Facility: CLINIC | Age: 26
End: 2021-05-06

## 2021-05-06 VITALS — TEMPERATURE: 97.9 F | HEIGHT: 63 IN | WEIGHT: 170 LBS | BODY MASS INDEX: 30.12 KG/M2

## 2021-05-06 PROCEDURE — 99024 POSTOP FOLLOW-UP VISIT: CPT

## 2021-06-03 ENCOUNTER — APPOINTMENT (OUTPATIENT)
Dept: PLASTIC SURGERY | Facility: CLINIC | Age: 26
End: 2021-06-03
Payer: COMMERCIAL

## 2021-06-03 VITALS — WEIGHT: 170 LBS | BODY MASS INDEX: 30.12 KG/M2 | TEMPERATURE: 98 F | HEIGHT: 63 IN

## 2021-06-03 PROCEDURE — 99212 OFFICE O/P EST SF 10 MIN: CPT

## 2021-06-03 PROCEDURE — 99072 ADDL SUPL MATRL&STAF TM PHE: CPT

## 2021-06-28 ENCOUNTER — APPOINTMENT (OUTPATIENT)
Dept: PSYCHIATRY | Facility: CLINIC | Age: 26
End: 2021-06-28
Payer: COMMERCIAL

## 2021-06-28 PROCEDURE — 99205 OFFICE O/P NEW HI 60 MIN: CPT

## 2021-06-28 PROCEDURE — 99072 ADDL SUPL MATRL&STAF TM PHE: CPT

## 2021-06-28 RX ORDER — HYDROCORTISONE 25 MG/G
2.5 CREAM TOPICAL
Qty: 1 | Refills: 1 | Status: DISCONTINUED | COMMUNITY
Start: 2021-03-09 | End: 2021-06-28

## 2021-06-28 RX ORDER — OXYCODONE 5 MG/1
5 TABLET ORAL
Qty: 8 | Refills: 0 | Status: DISCONTINUED | COMMUNITY
Start: 2021-03-02

## 2021-06-28 RX ORDER — CEFADROXIL 1000 MG/1
1 TABLET ORAL
Qty: 7 | Refills: 0 | Status: DISCONTINUED | COMMUNITY
Start: 2021-03-02

## 2021-06-30 ENCOUNTER — OUTPATIENT (OUTPATIENT)
Dept: OUTPATIENT SERVICES | Facility: HOSPITAL | Age: 26
LOS: 1 days | End: 2021-06-30

## 2021-06-30 VITALS
HEIGHT: 64 IN | SYSTOLIC BLOOD PRESSURE: 110 MMHG | HEART RATE: 80 BPM | RESPIRATION RATE: 16 BRPM | DIASTOLIC BLOOD PRESSURE: 70 MMHG | OXYGEN SATURATION: 96 % | TEMPERATURE: 98 F | WEIGHT: 179.9 LBS

## 2021-06-30 DIAGNOSIS — Z92.89 PERSONAL HISTORY OF OTHER MEDICAL TREATMENT: Chronic | ICD-10-CM

## 2021-06-30 DIAGNOSIS — Z90.13 ACQUIRED ABSENCE OF BILATERAL BREASTS AND NIPPLES: ICD-10-CM

## 2021-06-30 DIAGNOSIS — Z90.13 ACQUIRED ABSENCE OF BILATERAL BREASTS AND NIPPLES: Chronic | ICD-10-CM

## 2021-06-30 LAB
ANION GAP SERPL CALC-SCNC: 13 MMOL/L — SIGNIFICANT CHANGE UP (ref 7–14)
BUN SERPL-MCNC: 13 MG/DL — SIGNIFICANT CHANGE UP (ref 7–23)
CALCIUM SERPL-MCNC: 9.6 MG/DL — SIGNIFICANT CHANGE UP (ref 8.4–10.5)
CHLORIDE SERPL-SCNC: 103 MMOL/L — SIGNIFICANT CHANGE UP (ref 98–107)
CO2 SERPL-SCNC: 22 MMOL/L — SIGNIFICANT CHANGE UP (ref 22–31)
CREAT SERPL-MCNC: 0.74 MG/DL — SIGNIFICANT CHANGE UP (ref 0.5–1.3)
GLUCOSE SERPL-MCNC: 87 MG/DL — SIGNIFICANT CHANGE UP (ref 70–99)
HCG UR QL: NEGATIVE — SIGNIFICANT CHANGE UP
HCT VFR BLD CALC: 40.3 % — SIGNIFICANT CHANGE UP (ref 34.5–45)
HGB BLD-MCNC: 13.1 G/DL — SIGNIFICANT CHANGE UP (ref 11.5–15.5)
MCHC RBC-ENTMCNC: 26.6 PG — LOW (ref 27–34)
MCHC RBC-ENTMCNC: 32.5 GM/DL — SIGNIFICANT CHANGE UP (ref 32–36)
MCV RBC AUTO: 81.9 FL — SIGNIFICANT CHANGE UP (ref 80–100)
NRBC # BLD: 0 /100 WBCS — SIGNIFICANT CHANGE UP
NRBC # FLD: 0 K/UL — SIGNIFICANT CHANGE UP
PLATELET # BLD AUTO: 383 K/UL — SIGNIFICANT CHANGE UP (ref 150–400)
POTASSIUM SERPL-MCNC: 4.1 MMOL/L — SIGNIFICANT CHANGE UP (ref 3.5–5.3)
POTASSIUM SERPL-SCNC: 4.1 MMOL/L — SIGNIFICANT CHANGE UP (ref 3.5–5.3)
RBC # BLD: 4.92 M/UL — SIGNIFICANT CHANGE UP (ref 3.8–5.2)
RBC # FLD: 12.7 % — SIGNIFICANT CHANGE UP (ref 10.3–14.5)
SODIUM SERPL-SCNC: 138 MMOL/L — SIGNIFICANT CHANGE UP (ref 135–145)
WBC # BLD: 7.89 K/UL — SIGNIFICANT CHANGE UP (ref 3.8–10.5)
WBC # FLD AUTO: 7.89 K/UL — SIGNIFICANT CHANGE UP (ref 3.8–10.5)

## 2021-06-30 RX ORDER — LANOLIN ALCOHOL/MO/W.PET/CERES
1 CREAM (GRAM) TOPICAL
Qty: 0 | Refills: 0 | DISCHARGE

## 2021-06-30 RX ORDER — CITALOPRAM 10 MG/1
1 TABLET, FILM COATED ORAL
Qty: 0 | Refills: 0 | DISCHARGE

## 2021-06-30 RX ORDER — TRAZODONE HCL 50 MG
1 TABLET ORAL
Qty: 0 | Refills: 0 | DISCHARGE

## 2021-06-30 RX ORDER — SODIUM CHLORIDE 9 MG/ML
1000 INJECTION, SOLUTION INTRAVENOUS
Refills: 0 | Status: DISCONTINUED | OUTPATIENT
Start: 2021-07-05 | End: 2021-07-19

## 2021-06-30 RX ORDER — NORETHINDRONE AND ETHINYL ESTRADIOL 0.4-0.035
1 KIT ORAL
Qty: 0 | Refills: 0 | DISCHARGE

## 2021-06-30 NOTE — H&P PST ADULT - ASSESSMENT
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

## 2021-06-30 NOTE — H&P PST ADULT - NSICDXPROBLEM_GEN_ALL_CORE_FT
PROBLEM DIAGNOSES  Problem: Acquired absence of bilateral breasts and nipples  Assessment and Plan: Revision of bilateral reconstructed breast ,possible liposuction with fat grafting  Preop instructions provided and patient verbalizes understanding.  Labs done and results pending.   Famotidine provided with instructions. Hibiclens provided with instructions and was signed by patient. Teach-back method was utilized to assess patient's understanding. Patient verbalized understanding. Urine cup provided for stat ucg on admit .

## 2021-06-30 NOTE — H&P PST ADULT - NSICDXPASTSURGICALHX_GEN_ALL_CORE_FT
PAST SURGICAL HISTORY:  History of dental surgery     History of prophylactic mastectomy of both breasts 3/21

## 2021-06-30 NOTE — H&P PST ADULT - HISTORY OF PRESENT ILLNESS
This is a 26 y.o. female s/p bilateral breast mastectomy 3/2021 . BRCA positive , LMP 2 weeks ago . Pt acquired absence of bilateral breasts and nipples ,genetic susceptibility to malignant neoplasm of breast . Pt now for surgery .

## 2021-07-02 ENCOUNTER — APPOINTMENT (OUTPATIENT)
Dept: DISASTER EMERGENCY | Facility: CLINIC | Age: 26
End: 2021-07-02

## 2021-07-03 LAB — SARS-COV-2 N GENE NPH QL NAA+PROBE: NOT DETECTED

## 2021-07-03 NOTE — ASU PATIENT PROFILE, ADULT - TEACHING/LEARNING EDUCATIONAL LEVEL
no abdominal pain, no bloating, no constipation, no diarrhea, no nausea and no vomiting. [facility]

## 2021-07-04 ENCOUNTER — TRANSCRIPTION ENCOUNTER (OUTPATIENT)
Age: 26
End: 2021-07-04

## 2021-07-05 ENCOUNTER — APPOINTMENT (OUTPATIENT)
Dept: PLASTIC SURGERY | Facility: HOSPITAL | Age: 26
End: 2021-07-05
Payer: COMMERCIAL

## 2021-07-05 ENCOUNTER — RESULT REVIEW (OUTPATIENT)
Age: 26
End: 2021-07-05

## 2021-07-05 ENCOUNTER — OUTPATIENT (OUTPATIENT)
Dept: OUTPATIENT SERVICES | Facility: HOSPITAL | Age: 26
LOS: 1 days | Discharge: HOME | End: 2021-07-05
Payer: COMMERCIAL

## 2021-07-05 VITALS
SYSTOLIC BLOOD PRESSURE: 117 MMHG | OXYGEN SATURATION: 97 % | HEART RATE: 86 BPM | TEMPERATURE: 98 F | DIASTOLIC BLOOD PRESSURE: 74 MMHG | RESPIRATION RATE: 16 BRPM

## 2021-07-05 VITALS — OXYGEN SATURATION: 97 % | RESPIRATION RATE: 16 BRPM | HEART RATE: 94 BPM

## 2021-07-05 DIAGNOSIS — Z90.13 ACQUIRED ABSENCE OF BILATERAL BREASTS AND NIPPLES: Chronic | ICD-10-CM

## 2021-07-05 DIAGNOSIS — Z90.13 ACQUIRED ABSENCE OF BILATERAL BREASTS AND NIPPLES: ICD-10-CM

## 2021-07-05 DIAGNOSIS — Z92.89 PERSONAL HISTORY OF OTHER MEDICAL TREATMENT: Chronic | ICD-10-CM

## 2021-07-05 LAB — HCG UR QL: NEGATIVE — SIGNIFICANT CHANGE UP

## 2021-07-05 PROCEDURE — 19350 NIPPLE/AREOLA RECONSTRUCTION: CPT | Mod: LT

## 2021-07-05 PROCEDURE — 15772 GRFG AUTOL FAT LIPO EA ADDL: CPT

## 2021-07-05 PROCEDURE — 88304 TISSUE EXAM BY PATHOLOGIST: CPT | Mod: 26

## 2021-07-05 PROCEDURE — 19380 REVJ RECONSTRUCTED BREAST: CPT | Mod: 50

## 2021-07-05 PROCEDURE — 15771 GRFG AUTOL FAT LIPO 50 CC/<: CPT

## 2021-07-05 RX ORDER — ONDANSETRON 8 MG/1
4 TABLET, FILM COATED ORAL ONCE
Refills: 0 | Status: COMPLETED | OUTPATIENT
Start: 2021-07-05 | End: 2021-07-05

## 2021-07-05 RX ORDER — NORETHINDRONE AND ETHINYL ESTRADIOL 0.4-0.035
1 KIT ORAL
Qty: 0 | Refills: 0 | DISCHARGE

## 2021-07-05 RX ORDER — FENTANYL CITRATE 50 UG/ML
25 INJECTION INTRAVENOUS
Refills: 0 | Status: DISCONTINUED | OUTPATIENT
Start: 2021-07-05 | End: 2021-07-05

## 2021-07-05 RX ORDER — TRAZODONE HCL 50 MG
75 TABLET ORAL
Qty: 0 | Refills: 0 | DISCHARGE

## 2021-07-05 RX ORDER — FENTANYL CITRATE 50 UG/ML
50 INJECTION INTRAVENOUS
Refills: 0 | Status: DISCONTINUED | OUTPATIENT
Start: 2021-07-05 | End: 2021-07-05

## 2021-07-05 RX ORDER — SODIUM CHLORIDE 9 MG/ML
1000 INJECTION, SOLUTION INTRAVENOUS
Refills: 0 | Status: DISCONTINUED | OUTPATIENT
Start: 2021-07-05 | End: 2021-07-19

## 2021-07-05 RX ORDER — CITALOPRAM 10 MG/1
1 TABLET, FILM COATED ORAL
Qty: 0 | Refills: 0 | DISCHARGE

## 2021-07-05 RX ORDER — ONDANSETRON 8 MG/1
4 TABLET, FILM COATED ORAL ONCE
Refills: 0 | Status: DISCONTINUED | OUTPATIENT
Start: 2021-07-05 | End: 2021-07-19

## 2021-07-05 RX ORDER — LANOLIN ALCOHOL/MO/W.PET/CERES
1 CREAM (GRAM) TOPICAL
Qty: 0 | Refills: 0 | DISCHARGE

## 2021-07-05 RX ORDER — OXYCODONE AND ACETAMINOPHEN 5; 325 MG/1; MG/1
1 TABLET ORAL EVERY 4 HOURS
Refills: 0 | Status: DISCONTINUED | OUTPATIENT
Start: 2021-07-05 | End: 2021-07-05

## 2021-07-05 RX ADMIN — ONDANSETRON 4 MILLIGRAM(S): 8 TABLET, FILM COATED ORAL at 18:33

## 2021-07-05 NOTE — BRIEF OPERATIVE NOTE - OPERATION/FINDINGS
Hx of double incision mastectomy w/ FNG.  Lipo of abdomen and fat grafting to chest b/l.  L nipple reduction

## 2021-07-05 NOTE — ASU DISCHARGE PLAN (ADULT/PEDIATRIC) - NURSING INSTRUCTIONS
You received IV Tylenol for pain management at 310pm. Please DO NOT take any Tylenol (Acetaminophen) containing products, such as Vicodin, Percocet, Excedrin, and cold medications for the next 6 hours (until 910PM). DO NOT TAKE MORE THAN 3000 MG OF TYLENOL in a 24 hour period.  You received IV Toradol for pain management at 315pm. Please DO NOT take Motrin/Ibuprofen/Advil/Aleve/NSAIDs (Non-Steroidal Anti-Inflammatory Drugs) for the next 6 hours (until 915 PM).

## 2021-07-05 NOTE — ASU DISCHARGE PLAN (ADULT/PEDIATRIC) - PROCEDURE
Revision of bilateral chest w/ left nipple reduction, liposuction, and fat grafting by Dr. Praveen Crain.

## 2021-07-05 NOTE — ASU DISCHARGE PLAN (ADULT/PEDIATRIC) - BATHING
keep chest incisions dry until your first post op visit. you can shower normally waist down!/Sitz bath (specify frequency)/Sponge only Keep incision dry for 5 days/Sponge only

## 2021-07-05 NOTE — ASU DISCHARGE PLAN (ADULT/PEDIATRIC) - ASU DC SPECIAL INSTRUCTIONSFT
1. Please follow up with Dr. Crain's PA, Christine, next week TUESDAY 07/13/21, for your first post operative visit.   Please call [phone] for an appointment time.    2. Please avoid any strenuous activities, AVOID bending, stretching, reaching overhead, or any heavy lifting. Please keep all dressings intact & the bra intact. Do not get the breast incisions wet, but you may shower normally waist down.    3. Some OOZING and blood on the dressing is normal and to be expected. If it becomes saturated w/ BRIGHT red blood that does not stop, please call [phone] for email CHRISTINE: [email or web address]    4. Your medications were sent to your pharmacy.  Please take the prescribed medications as directed.   For MILD pain please take regular over the counter pain medications such as: Advil, Tylenol, Motrin.   Only take the narcotic prescribed pain medication for SEVERE PAIN.   If constipation occurs after taking narcotic pain medications, please take an over the counter stool softener.

## 2021-07-05 NOTE — ASU DISCHARGE PLAN (ADULT/PEDIATRIC) - COMMENTS
Please see Christine PEACOCK (Bogum Kwon), Dr. Crain's PA, for your first post op visit next week TUESDAY.  Call the office to verify the time.  [phone], or [phone].

## 2021-07-05 NOTE — ASU DISCHARGE PLAN (ADULT/PEDIATRIC) - CARE PROVIDER_API CALL
Osman Nieto)  Physician Assistant Services  [address], [address]  [place] [address]  Phone: [phone]  Fax: [phone]  Established Patient  Scheduled Appointment: 07/13/2021

## 2021-07-07 LAB — SURGICAL PATHOLOGY STUDY: SIGNIFICANT CHANGE UP

## 2021-07-16 ENCOUNTER — APPOINTMENT (OUTPATIENT)
Dept: PLASTIC SURGERY | Facility: CLINIC | Age: 26
End: 2021-07-16

## 2021-07-16 VITALS
TEMPERATURE: 97.2 F | WEIGHT: 175 LBS | HEART RATE: 83 BPM | HEIGHT: 63 IN | SYSTOLIC BLOOD PRESSURE: 115 MMHG | DIASTOLIC BLOOD PRESSURE: 80 MMHG | BODY MASS INDEX: 31.01 KG/M2 | OXYGEN SATURATION: 95 %

## 2021-07-16 NOTE — HISTORY OF PRESENT ILLNESS
[FreeTextEntry1] : DOS 07/05/21\par s/p  Revision of bilateral reconstructed chests/breast with liposuction, fat grafting.\par \par \par

## 2021-07-21 ENCOUNTER — APPOINTMENT (OUTPATIENT)
Dept: PSYCHIATRY | Facility: CLINIC | Age: 26
End: 2021-07-21
Payer: COMMERCIAL

## 2021-07-21 PROCEDURE — 99214 OFFICE O/P EST MOD 30 MIN: CPT

## 2021-07-21 PROCEDURE — 99072 ADDL SUPL MATRL&STAF TM PHE: CPT

## 2021-08-03 ENCOUNTER — APPOINTMENT (OUTPATIENT)
Dept: PLASTIC SURGERY | Facility: CLINIC | Age: 26
End: 2021-08-03

## 2021-08-12 ENCOUNTER — APPOINTMENT (OUTPATIENT)
Dept: PLASTIC SURGERY | Facility: CLINIC | Age: 26
End: 2021-08-12
Payer: COMMERCIAL

## 2021-08-12 PROCEDURE — 99024 POSTOP FOLLOW-UP VISIT: CPT

## 2021-08-18 ENCOUNTER — APPOINTMENT (OUTPATIENT)
Dept: PSYCHIATRY | Facility: CLINIC | Age: 26
End: 2021-08-18
Payer: COMMERCIAL

## 2021-08-18 PROCEDURE — 99214 OFFICE O/P EST MOD 30 MIN: CPT

## 2021-09-22 ENCOUNTER — APPOINTMENT (OUTPATIENT)
Dept: PSYCHIATRY | Facility: CLINIC | Age: 26
End: 2021-09-22
Payer: COMMERCIAL

## 2021-09-22 DIAGNOSIS — F32.9 MAJOR DEPRESSIVE DISORDER, SINGLE EPISODE, UNSPECIFIED: ICD-10-CM

## 2021-09-22 PROCEDURE — 99214 OFFICE O/P EST MOD 30 MIN: CPT

## 2021-09-22 RX ORDER — COVID-19 TEST SPECIMEN COLLECT
MISCELLANEOUS MISCELLANEOUS
Qty: 1 | Refills: 0 | Status: DISCONTINUED | COMMUNITY
Start: 2021-09-03 | End: 2021-09-22

## 2021-10-13 ENCOUNTER — APPOINTMENT (OUTPATIENT)
Dept: INTERNAL MEDICINE | Facility: CLINIC | Age: 26
End: 2021-10-13
Payer: COMMERCIAL

## 2021-10-13 VITALS
SYSTOLIC BLOOD PRESSURE: 114 MMHG | TEMPERATURE: 98 F | HEIGHT: 63 IN | BODY MASS INDEX: 30.65 KG/M2 | OXYGEN SATURATION: 96 % | WEIGHT: 173 LBS | HEART RATE: 95 BPM | DIASTOLIC BLOOD PRESSURE: 79 MMHG

## 2021-10-13 DIAGNOSIS — Z83.3 FAMILY HISTORY OF DIABETES MELLITUS: ICD-10-CM

## 2021-10-13 DIAGNOSIS — Z01.818 ENCOUNTER FOR OTHER PREPROCEDURAL EXAMINATION: ICD-10-CM

## 2021-10-13 DIAGNOSIS — Z87.39 PERSONAL HISTORY OF OTHER DISEASES OF THE MUSCULOSKELETAL SYSTEM AND CONNECTIVE TISSUE: ICD-10-CM

## 2021-10-13 DIAGNOSIS — Z80.3 FAMILY HISTORY OF MALIGNANT NEOPLASM OF BREAST: ICD-10-CM

## 2021-10-13 DIAGNOSIS — Z00.00 ENCOUNTER FOR GENERAL ADULT MEDICAL EXAMINATION W/OUT ABNORMAL FINDINGS: ICD-10-CM

## 2021-10-13 DIAGNOSIS — Z71.84 ENC FOR HEALTH COUNSELING RELATED TO TRAVEL: ICD-10-CM

## 2021-10-13 DIAGNOSIS — E66.09 OTHER OBESITY DUE TO EXCESS CALORIES: ICD-10-CM

## 2021-10-13 DIAGNOSIS — Z83.79 FAMILY HISTORY OF OTHER DISEASES OF THE DIGESTIVE SYSTEM: ICD-10-CM

## 2021-10-13 PROCEDURE — 36415 COLL VENOUS BLD VENIPUNCTURE: CPT

## 2021-10-13 PROCEDURE — 99385 PREV VISIT NEW AGE 18-39: CPT | Mod: 25

## 2021-10-17 PROBLEM — E66.09 OTHER OBESITY DUE TO EXCESS CALORIES: Status: ACTIVE | Noted: 2021-10-13

## 2021-10-17 NOTE — HEALTH RISK ASSESSMENT
[] : No [No] : In the past 12 months have you used drugs other than those required for medical reasons? No [0] : 2) Feeling down, depressed, or hopeless: Not at all (0) [PHQ-2 Negative - No further assessment needed] : PHQ-2 Negative - No further assessment needed [DVV4Owrmd] : 0 [Patient reported PAP Smear was normal] : Patient reported PAP Smear was normal [Change in mental status noted] : No change in mental status noted [Alone] : lives alone [Employed] : employed [Single] : single [Sexually Active] : sexually active [Reports changes in hearing] : Reports no changes in hearing [Reports changes in vision] : Reports no changes in vision [Reports normal functional visual acuity (ie: able to read med bottle)] : Reports normal functional visual acuity [Reports changes in dental health] : Reports no changes in dental health [PapSmearDate] : 2/2021

## 2021-10-17 NOTE — HISTORY OF PRESENT ILLNESS
[de-identified] : 25 y/o patient  is new to the office presents today for annual CPE/fasting labs\par - HCM: UTD with PAP 02/2021 WNL, had prophylactic mastectomy, +BRCA 2 \par - states prefers (they/them) pronouns, wants information to transition from female to male\par - f/u anxiety - stable on current medications, following with psych monthly and therapist weekly \par \par

## 2021-10-27 ENCOUNTER — TRANSCRIPTION ENCOUNTER (OUTPATIENT)
Age: 26
End: 2021-10-27

## 2021-10-27 LAB
25(OH)D3 SERPL-MCNC: 35.1 NG/ML
ALBUMIN SERPL ELPH-MCNC: 4.7 G/DL
ALP BLD-CCNC: 89 U/L
ALT SERPL-CCNC: 11 U/L
ANION GAP SERPL CALC-SCNC: 13 MMOL/L
APPEARANCE: CLEAR
AST SERPL-CCNC: 16 U/L
BACTERIA: NEGATIVE
BASOPHILS # BLD AUTO: 0.07 K/UL
BASOPHILS NFR BLD AUTO: 1 %
BILIRUB SERPL-MCNC: 0.2 MG/DL
BILIRUBIN URINE: NEGATIVE
BLOOD URINE: NORMAL
BUN SERPL-MCNC: 12 MG/DL
CALCIUM SERPL-MCNC: 9.6 MG/DL
CHLORIDE SERPL-SCNC: 101 MMOL/L
CHOLEST SERPL-MCNC: 194 MG/DL
CO2 SERPL-SCNC: 23 MMOL/L
COLOR: YELLOW
CREAT SERPL-MCNC: 0.82 MG/DL
EOSINOPHIL # BLD AUTO: 0.11 K/UL
EOSINOPHIL NFR BLD AUTO: 1.5 %
ESTIMATED AVERAGE GLUCOSE: 111 MG/DL
GLUCOSE QUALITATIVE U: NEGATIVE
GLUCOSE SERPL-MCNC: 128 MG/DL
HBA1C MFR BLD HPLC: 5.5 %
HCT VFR BLD CALC: 44.5 %
HDLC SERPL-MCNC: 57 MG/DL
HGB BLD-MCNC: 14.2 G/DL
HYALINE CASTS: 2 /LPF
IMM GRANULOCYTES NFR BLD AUTO: 0.3 %
KETONES URINE: NEGATIVE
LDLC SERPL CALC-MCNC: 108 MG/DL
LEUKOCYTE ESTERASE URINE: ABNORMAL
LYMPHOCYTES # BLD AUTO: 2.9 K/UL
LYMPHOCYTES NFR BLD AUTO: 39.6 %
MAN DIFF?: NORMAL
MCHC RBC-ENTMCNC: 27.5 PG
MCHC RBC-ENTMCNC: 31.9 GM/DL
MCV RBC AUTO: 86.2 FL
MICROSCOPIC-UA: NORMAL
MONOCYTES # BLD AUTO: 0.54 K/UL
MONOCYTES NFR BLD AUTO: 7.4 %
NEUTROPHILS # BLD AUTO: 3.68 K/UL
NEUTROPHILS NFR BLD AUTO: 50.2 %
NITRITE URINE: NEGATIVE
NONHDLC SERPL-MCNC: 137 MG/DL
PH URINE: 6
PLATELET # BLD AUTO: 396 K/UL
POTASSIUM SERPL-SCNC: 4.4 MMOL/L
PROT SERPL-MCNC: 7 G/DL
PROTEIN URINE: NEGATIVE
RBC # BLD: 5.16 M/UL
RBC # FLD: 13.2 %
RED BLOOD CELLS URINE: 2 /HPF
SODIUM SERPL-SCNC: 137 MMOL/L
SPECIFIC GRAVITY URINE: 1.02
SQUAMOUS EPITHELIAL CELLS: 3 /HPF
T3FREE SERPL-MCNC: 3.38 PG/ML
T4 FREE SERPL-MCNC: 1.1 NG/DL
THYROGLOB AB SERPL-ACNC: <20 IU/ML
THYROPEROXIDASE AB SERPL IA-ACNC: <10 IU/ML
TRIGL SERPL-MCNC: 149 MG/DL
TSH SERPL-ACNC: 1.03 UIU/ML
UROBILINOGEN URINE: NORMAL
VIT B12 SERPL-MCNC: 256 PG/ML
WBC # FLD AUTO: 7.32 K/UL
WHITE BLOOD CELLS URINE: 12 /HPF

## 2021-11-10 ENCOUNTER — APPOINTMENT (OUTPATIENT)
Dept: PSYCHIATRY | Facility: CLINIC | Age: 26
End: 2021-11-10
Payer: COMMERCIAL

## 2021-11-10 PROCEDURE — 99214 OFFICE O/P EST MOD 30 MIN: CPT

## 2021-11-11 ENCOUNTER — NON-APPOINTMENT (OUTPATIENT)
Age: 26
End: 2021-11-11

## 2021-12-29 ENCOUNTER — NON-APPOINTMENT (OUTPATIENT)
Age: 26
End: 2021-12-29

## 2022-01-05 ENCOUNTER — NON-APPOINTMENT (OUTPATIENT)
Age: 27
End: 2022-01-05

## 2022-01-05 ENCOUNTER — APPOINTMENT (OUTPATIENT)
Dept: TRANSGENDER CARE | Facility: CLINIC | Age: 27
End: 2022-01-05

## 2022-01-31 ENCOUNTER — APPOINTMENT (OUTPATIENT)
Dept: TRANSGENDER CARE | Facility: CLINIC | Age: 27
End: 2022-01-31
Payer: COMMERCIAL

## 2022-01-31 VITALS
SYSTOLIC BLOOD PRESSURE: 110 MMHG | OXYGEN SATURATION: 96 % | DIASTOLIC BLOOD PRESSURE: 80 MMHG | WEIGHT: 174 LBS | TEMPERATURE: 97.3 F | BODY MASS INDEX: 30.83 KG/M2 | HEART RATE: 95 BPM | HEIGHT: 63 IN

## 2022-01-31 DIAGNOSIS — Z13.29 ENCOUNTER FOR SCREENING FOR OTHER SUSPECTED ENDOCRINE DISORDER: ICD-10-CM

## 2022-01-31 PROCEDURE — 99204 OFFICE O/P NEW MOD 45 MIN: CPT

## 2022-01-31 NOTE — PHYSICAL EXAM
[No Acute Distress] : no acute distress [Well Nourished] : well nourished [Well Developed] : well developed [Well-Appearing] : well-appearing [Normal Voice/Communication] : normal voice/communication [Normal Sclera/Conjunctiva] : normal sclera/conjunctiva [PERRL] : pupils equal round and reactive to light [Normal Outer Ear/Nose] : the outer ears and nose were normal in appearance [Normal Oropharynx] : the oropharynx was normal [No JVD] : no jugular venous distention [Supple] : supple [No Respiratory Distress] : no respiratory distress  [No Accessory Muscle Use] : no accessory muscle use [Clear to Auscultation] : lungs were clear to auscultation bilaterally [Normal Rate] : normal rate  [Regular Rhythm] : with a regular rhythm [Pedal Pulses Present] : the pedal pulses are present [No Edema] : there was no peripheral edema [Soft] : abdomen soft [Non Tender] : non-tender [Non-distended] : non-distended [Normal Supraclavicular Nodes] : no supraclavicular lymphadenopathy [Normal Posterior Cervical Nodes] : no posterior cervical lymphadenopathy [Normal Anterior Cervical Nodes] : no anterior cervical lymphadenopathy [No CVA Tenderness] : no CVA  tenderness [No Spinal Tenderness] : no spinal tenderness [Speech Grossly Normal] : speech grossly normal [Memory Grossly Normal] : memory grossly normal [Normal Affect] : the affect was normal [Alert and Oriented x3] : oriented to person, place, and time [Normal Mood] : the mood was normal [Normal Insight/Judgement] : insight and judgment were intact

## 2022-01-31 NOTE — HEALTH RISK ASSESSMENT
[Good] : ~his/her~  mood as  good [Never] : Never [No] : No [# of Members in Household ___] :  household currently consist of [unfilled] member(s) [Employed] : employed [Single] : single [Sexually Active] : sexually active [High Risk Behavior] : no high risk behavior [Fully functional (bathing, dressing, toileting, transferring, walking, feeding)] : Fully functional (bathing, dressing, toileting, transferring, walking, feeding) [Fully functional (using the telephone, shopping, preparing meals, housekeeping, doing laundry, using] : Fully functional and needs no help or supervision to perform IADLs (using the telephone, shopping, preparing meals, housekeeping, doing laundry, using transportation, managing medications and managing finances) [Reports changes in hearing] : Reports no changes in hearing [Smoke Detector] : smoke detector [Carbon Monoxide Detector] : carbon monoxide detector [Guns at Home] : no guns at home [Safety elements used in home] : safety elements used in home [Seat Belt] :  uses seat belt [de-identified] : living with roomate

## 2022-01-31 NOTE — HISTORY OF PRESENT ILLNESS
[FreeTextEntry1] : Starting HRT and initial intake to LGBTQ program [de-identified] : SYLVIA RANGEL (CELIA) is a 26 year old, genderqueer, AFAB, seen on 01/31/2022 for intial transgender care program intake visit.\par \par Preferred Pronouns: they/them\par Sexual orientation: bisexual\par Gender identity: non-binary\par \par Transition history (Social, Endo, Surgical): Patient states that they came out in 2016 as agender but was unsure if that was the correct feeling.  I few years later felt that they identified as non-binary.  Had double mastectomy 3/2021 due to BRCA gene inheritance and felt like this was affirming and right after having a mastectomy.  Has never been on GHT, no other medical interventions.  Has transitioned socially by using preferred name and neutral pronouns. Feels supported by most family and friends are accepting.\par \par \par Existing provider team:  PMD: Dr. Chi ([facility])    Endo: needs   GYN: Dr. Casey  Psychiatry: Dr. Rueda   Therapist:  Dr. Gustafson\par \par Mental Health: JUJU, depression\par Psychiatry: sees once monthly - Cytalopram, Buproprion, Trazadone (sleep)\par Psychology: sees once weekly\par History of mental health admission: 6/2012 - admitted for SI\par History of Suicidal/homicidal ideation & Self harm: Had suicidal thoughts (without plan) from approx 2012 - 2014, but denies any thoughts of SI/HI at present.\par \par Sexual history: Single, bisexual, last sexual relationship was May 2021\par \par Last HIV test: (-) 5/21\par Last STI tests and sites: (-) 5/21\par Pap/Self-exam/Mammography/Colonoscopy: bilateral mastectomy due to BRCA (+) gene; pap done 2/21 (WNL)\par \par Endo History: none\par \par GYN history:  BRCA 2 (+) w/ double mastectomy\par \par Reproductive Endo History: would like more information\par \par Nutrition: Reports average diet, generally eats a balanced diet; takes MVIs (Vit D and B12).\par \par Tattoos:  none\par \par Goals of Trans care:\par 1) Initiate HRT\par 2) Additional therapeutic connections\par 3) Social Support resources\par \par \par \par

## 2022-01-31 NOTE — PLAN
[FreeTextEntry1] : TRANSCARE:\par - Patient wishing to maintain care with current PCP\par - Patient to explore fertility preservation options, provided contact info for reproductive endo, will also explore options.\par - Patient to schedule gender appointment with endo pending MH clearance. \par - Patient to follow up with endocrinology. \par - Possible surgical interventions in the future but no interest at this time. \par - Resources for MH and social supports provided\par - Questions answered and concerns addressed at this time.\par \par

## 2022-02-02 ENCOUNTER — APPOINTMENT (OUTPATIENT)
Dept: PSYCHIATRY | Facility: CLINIC | Age: 27
End: 2022-02-02
Payer: COMMERCIAL

## 2022-02-02 PROCEDURE — 99214 OFFICE O/P EST MOD 30 MIN: CPT

## 2022-02-16 ENCOUNTER — APPOINTMENT (OUTPATIENT)
Dept: TRANSGENDER CARE | Facility: CLINIC | Age: 27
End: 2022-02-16
Payer: COMMERCIAL

## 2022-02-16 VITALS
HEART RATE: 91 BPM | OXYGEN SATURATION: 97 % | WEIGHT: 171 LBS | TEMPERATURE: 98 F | DIASTOLIC BLOOD PRESSURE: 78 MMHG | HEIGHT: 63 IN | SYSTOLIC BLOOD PRESSURE: 114 MMHG | BODY MASS INDEX: 30.3 KG/M2

## 2022-02-16 PROCEDURE — 99215 OFFICE O/P EST HI 40 MIN: CPT

## 2022-02-16 NOTE — END OF VISIT
Patient resting quietly in bed with eyes closed. Respirations even and unlabored with no signs of distress observed. Environmental rounds continued. [Time Spent: ___ minutes] : I have spent [unfilled] minutes of time on the encounter.

## 2022-02-28 ENCOUNTER — TRANSCRIPTION ENCOUNTER (OUTPATIENT)
Age: 27
End: 2022-02-28

## 2022-03-04 LAB
ALBUMIN SERPL ELPH-MCNC: 4.6 G/DL
ALP BLD-CCNC: 75 U/L
ALT SERPL-CCNC: 12 U/L
ANION GAP SERPL CALC-SCNC: 13 MMOL/L
AST SERPL-CCNC: 14 U/L
BASOPHILS # BLD AUTO: 0.06 K/UL
BASOPHILS NFR BLD AUTO: 1 %
BILIRUB SERPL-MCNC: 0.3 MG/DL
BUN SERPL-MCNC: 12 MG/DL
CALCIUM SERPL-MCNC: 9.5 MG/DL
CHLORIDE SERPL-SCNC: 106 MMOL/L
CO2 SERPL-SCNC: 21 MMOL/L
CREAT SERPL-MCNC: 0.85 MG/DL
EOSINOPHIL # BLD AUTO: 0.12 K/UL
EOSINOPHIL NFR BLD AUTO: 1.9 %
ESTRADIOL SERPL-MCNC: 16 PG/ML
FSH SERPL-MCNC: 2.4 IU/L
GLUCOSE SERPL-MCNC: 90 MG/DL
HCT VFR BLD CALC: 39.8 %
HGB BLD-MCNC: 12.8 G/DL
IMM GRANULOCYTES NFR BLD AUTO: 0.2 %
LH SERPL-ACNC: 3.5 IU/L
LYMPHOCYTES # BLD AUTO: 2.6 K/UL
LYMPHOCYTES NFR BLD AUTO: 42.1 %
MAN DIFF?: NORMAL
MCHC RBC-ENTMCNC: 27 PG
MCHC RBC-ENTMCNC: 32.2 GM/DL
MCV RBC AUTO: 84 FL
MONOCYTES # BLD AUTO: 0.44 K/UL
MONOCYTES NFR BLD AUTO: 7.1 %
NEUTROPHILS # BLD AUTO: 2.95 K/UL
NEUTROPHILS NFR BLD AUTO: 47.7 %
PLATELET # BLD AUTO: 386 K/UL
POTASSIUM SERPL-SCNC: 4.6 MMOL/L
PROT SERPL-MCNC: 6.8 G/DL
RBC # BLD: 4.74 M/UL
RBC # FLD: 13.2 %
SODIUM SERPL-SCNC: 140 MMOL/L
TESTOST SERPL-MCNC: 88.1 NG/DL
WBC # FLD AUTO: 6.18 K/UL

## 2022-03-07 NOTE — ADDENDUM
[FreeTextEntry1] : There is a lack of long-term population data on the risks of GAHT in BRCA+ transmasculine patients. Overall limited data. There are case reports describing ovarian/endometrial cancer risks - it is possible that androgens through reducing endometrial proliferation/ovulation can lead to lower rates of endometrial/ovarian cancer. \par For patients who had underwent risk-reducing mastectomy, pt's require yearly clinical exams and serial monitoring if no salpingo-oophorectomy (via transvaginal vs. transrectal US).\par \par Mecca Chakraborty MD MSCI; Eliu Sorto MD MPH; Armando Lindsey MD; Williams Pineda MD Creating Breast and Gynecologic Cancer Guidelines for Transgender Patients With BRCA Mutations, Obstetrics & Gynecology: December 2021 - Volume 138 - Issue 6 - p 911-917\par doi: [id number]

## 2022-03-07 NOTE — ASSESSMENT
[FreeTextEntry1] : Ross is a 27 yo genderqueer AFAB person (they/them) with PMH of JUJU, depression  here for gender affirming hormone evaluation.\par \par #Gender dysphoria in adult:\par - Discussed options for gender-affirming hormone therapy, goals, risks (erythrocytosis, acne, male pattern hair loss, infertility) and benefits of therapy, as well as options for fertility preservation (cryopreservation of oocytes/embryos), and surgical intervention. The patient is interested in fertility preservation. Provided referral today.\par -Expected timeline for changes to occur (cessation of menses, deep voice, increase in muscle bulk, acne, increased libido) in 3-6 months, longer term changes include male pattern hair loss, clitoral enlargement.\par -Counseled that pregnancy can still occur on GAHT and that patient must avoid pregnancy while taking GAHT \par - Discussed goals with patient including aiming for middle range of cismale testosterone  (500-700 ng/dL) vs low range based on patient's goals and need to check level q3 months during first year of therapy (then q6-12 months on stable dose)\par - Wants to meet with mental health team at [facility] center to help make decision about starting hormones. Wants to continue exploring prior to initiating any GAHT. Will inquire about establishing care with center.\par - if GAHT is started, pt is interested in the gel\par -Has concerns about BRCA and testosterone, as well as ability to carry pregnancy if they changed their mind later, and how hormone levels would be affected if testosterone was stopped.\par -Pt would like to continue OCP (Junel) for contraception at this time\par \par LABS:\par - check baseline labs: CBC with diff, CMP, estradiol, testosterone; lipids, HbA1c yearly [note that pt is on OCP]\par \par HEALTH MAINTENANCE:\par - pap UTD 2/2021\par - BRCA+, s/p bilateral mastectomy\par \par #Vit d deficiency\par -vit d replete in 10/2021\par - continue OTC vit D\par \par Return for follow up visit in 3 months.\par pt contact: [phone]\par  \par

## 2022-03-07 NOTE — HISTORY OF PRESENT ILLNESS
[FreeTextEntry1] : CC: Gender affirming hormone therapy\par HPI: Ross is a 27 yo genderqueer AFAB person (they/them) with PMH of JUJU, depression  here for gender affirming hormone evaluation.\par  \par name: Ross\par pronouns: they/them\par Gender identity: non-binary\par \par Transition history: as per visit with NP Ileana Ley\par "Patient states that they came out in 2016 as agender but was unsure if that was the correct feeling. I few years later felt that they identified as non-binary. Had double mastectomy 3/2021 due to BRCA gene inheritance and felt like this was affirming and right after having a mastectomy. Has never been on GHT, no other medical interventions. Has transitioned socially by using preferred name and neutral pronouns. Feels supported by most family and friends are accepting."\par Mother has metastatic breast cancer.\par \par Interested in testosterone (low dose). Interested in gel.\par Never been on hormones.\par \par Menstrual hx: \par Menarche: 12-14 yo\par Regular, then when on OCP since high school - still on. Junel \par LMP 1/28/22\par Dysphoria from midsection/hips, voice, face.\par Used to bind prior to mastectomy, no packing\par No future surgeries planned\par Interested possibly in biological children. Previously received referrals for fertility preservation and is interested again today. \par [facility]. Wants to continue exploring prior to initiating. \par Has concerns about BRCA and testosterone, as well as ability to carry pregnancy if they changed their mind later, and how hormone levels would be affected if testosterone was stopped.\par Pt would like to continue OCP for contraception.\par  \par Age-appropriate cancer screening:\par Last pap smear: 2/2021 normal\par Last mammography: s/p bilateral mastectomy due to BRCA+\par Last Colonoscopy: n/a\par  \par FHx is significant for: Reports metastatic breast cancer in mom; denies VTE, CAD/CVA, colon cancer\par  \par SH:\par No cigarettes. Social etoh. Denies drug use\par Sees therapist weekly, sees psychiatrist q2 months. 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

## 2022-03-16 ENCOUNTER — APPOINTMENT (OUTPATIENT)
Dept: TRANSGENDER CARE | Facility: CLINIC | Age: 27
End: 2022-03-16
Payer: COMMERCIAL

## 2022-03-16 PROCEDURE — 90834 PSYTX W PT 45 MINUTES: CPT | Mod: 95

## 2022-03-30 ENCOUNTER — APPOINTMENT (OUTPATIENT)
Dept: SURGICAL ONCOLOGY | Facility: CLINIC | Age: 27
End: 2022-03-30
Payer: COMMERCIAL

## 2022-03-30 VITALS
WEIGHT: 168 LBS | DIASTOLIC BLOOD PRESSURE: 79 MMHG | HEIGHT: 63 IN | HEART RATE: 83 BPM | TEMPERATURE: 97.9 F | SYSTOLIC BLOOD PRESSURE: 113 MMHG | RESPIRATION RATE: 16 BRPM | OXYGEN SATURATION: 95 % | BODY MASS INDEX: 29.77 KG/M2

## 2022-03-30 DIAGNOSIS — Z90.13 ACQUIRED ABSENCE OF BILATERAL BREASTS AND NIPPLES: ICD-10-CM

## 2022-03-30 DIAGNOSIS — Z15.01 GENETIC SUSCEPTIBILITY TO MALIGNANT NEOPLASM OF BREAST: ICD-10-CM

## 2022-03-30 DIAGNOSIS — Z15.09 GENETIC SUSCEPTIBILITY TO MALIGNANT NEOPLASM OF BREAST: ICD-10-CM

## 2022-03-30 PROCEDURE — 99214 OFFICE O/P EST MOD 30 MIN: CPT

## 2022-04-01 NOTE — PHYSICAL EXAM
[Normal] : supple, no neck mass and thyroid not enlarged [Normal Supraclavicular Lymph Nodes] : normal supraclavicular lymph nodes [Normal Axillary Lymph Nodes] : normal axillary lymph nodes [Normal] : oriented to person, place and time, with appropriate affect [FreeTextEntry1] : DEBI present during exam  [de-identified] : s/p bilateral mastectomies with no reconstruction; nipple present (nipple sparing mastectomy); no masses bilaterally

## 2022-04-01 NOTE — REASON FOR VISIT
[Follow-Up Visit] : a follow-up visit for [FreeTextEntry2] : BRCA 2+ /s/p bilateral prophylactic nipple sparing mastectomies 3/1/2021

## 2022-04-01 NOTE — ASSESSMENT
[FreeTextEntry1] : IMP.\par +FH; BRCA 2+\par Decided to undergo B/L prophylactic nipple sparing mastectomies 3/1/2021 with benign pathology \par \par \par PLAN:\par RTO yearly \par \par

## 2022-04-01 NOTE — HISTORY OF PRESENT ILLNESS
[de-identified] : Ms. VINNY RANGEL  is a 27 year  old female  presenting for a follow up visit. \par Pt's mother has breast cancer and tested positive for the BRCA gene.  Pt. decided to undergo genetic testing in 12/2020 and was found to be BRCA2(+). \par \par She is s/p bilateral prophylactic nipple sparing mastectomies on 3/1/2021. No reconstruction. \par Final Pathology (3/1/21): \par 1. Breast, left, retro areolar tissue, excision\par - Breast tissue with stromal fibrosis\par \par 2. Breast, right, retro areolar tissue, excision\par - No tissue in container\par \par 3. Breast, left, nipple sparing mastectomy\par - Breast tissue with stromal fibrosis\par - Benign skin\par \par 4. Breast, right, nipple sparing mastectomy\par - Breast tissue with stromal fibrosis\par \par Patient denies palpable chest wall masses or pain.  Reports occasional right chest wall pruritus.  Her mother has metastatic breast cancer and is being treated with chemotherapy.  No health changes.

## 2022-04-27 ENCOUNTER — APPOINTMENT (OUTPATIENT)
Dept: PSYCHIATRY | Facility: CLINIC | Age: 27
End: 2022-04-27
Payer: COMMERCIAL

## 2022-04-27 PROCEDURE — 99214 OFFICE O/P EST MOD 30 MIN: CPT

## 2022-04-27 RX ORDER — NORETHINDRONE ACETATE AND ETHINYL ESTRADIOL AND FERROUS FUMARATE 1MG-20(21)
1-20 KIT ORAL
Qty: 84 | Refills: 0 | Status: DISCONTINUED | COMMUNITY
Start: 2021-05-24 | End: 2022-04-27

## 2022-05-04 ENCOUNTER — APPOINTMENT (OUTPATIENT)
Dept: TRANSGENDER CARE | Facility: CLINIC | Age: 27
End: 2022-05-04
Payer: COMMERCIAL

## 2022-05-04 VITALS
OXYGEN SATURATION: 97 % | TEMPERATURE: 97.9 F | SYSTOLIC BLOOD PRESSURE: 102 MMHG | BODY MASS INDEX: 30.48 KG/M2 | WEIGHT: 172 LBS | DIASTOLIC BLOOD PRESSURE: 80 MMHG | HEART RATE: 85 BPM | HEIGHT: 63 IN

## 2022-05-04 PROCEDURE — 99215 OFFICE O/P EST HI 40 MIN: CPT

## 2022-05-04 NOTE — HISTORY OF PRESENT ILLNESS
[FreeTextEntry1] : CC: Gender affirming hormone therapy\par HPI: Ross is a 26 yo genderqueer AFAB person (they/them) with PMH of JUJU, depression  here for gender affirming hormone evaluation.\par  \par name: Ross\par pronouns: they/them\par Gender identity: non-binary\par \par Transition history: as per visit with NP Ileana Ley\par "Patient states that they came out in 2016 as agender but was unsure if that was the correct feeling. I few years later felt that they identified as non-binary. Had double mastectomy 3/2021 due to BRCA gene inheritance and felt like this was affirming and right after having a mastectomy. Has never been on GHT, no other medical interventions. Has transitioned socially by using preferred name and neutral pronouns. Feels supported by most family and friends are accepting."\par Mother has metastatic breast cancer.\par \par Interested in testosterone (low dose). Interested in gel.\par Never been on hormones.\par \par Menstrual hx: \par Menarche: 12-14 yo\par Regular, then when on OCP since high school. On Junel previously, on hold temporarily, interested in resuming.\par Off OCP now since 2/2022. Mom has PCOS. \par LMP 1/28/22. No bleeding since off OCP\par Dysphoria from midsection/hips, voice, face.\par Used to bind prior to mastectomy, no packing\par No future surgeries planned\par Interested possibly in biological children. Previously received referrals for fertility preservation and is interested again today. \par Wants to meet with mental health team at [facility] to help make decision about starting hormones. Wants to continue exploring prior to initiating. \par Has concerns about BRCA and testosterone, as well as ability to carry pregnancy if they changed their mind later, and how hormone levels would be affected if testosterone was stopped.\par Pt would like to continue OCP for contraception. Labs on OCP showed high testosterone, pt thinks irregular menses prior to OCP. Pt stopped OCP>8 weeks and would like to assess baseline hormones off hormonal contraception prior to GAHT.\par Wants to start GAHT as soon as possible. Interested in gel 1 pump daily. May consider resuming OCP after\par  \par Age-appropriate cancer screening:\par Last pap smear: 2/2021 normal\par Last mammography: s/p bilateral mastectomy due to BRCA+\par Last Colonoscopy: n/a\par  \par FHx: Reports metastatic breast cancer in mom; denies VTE, CAD/CVA, colon cancer\par  \par SH:\par No cigarettes. Social etoh. Denies drug use\par Sees therapist weekly, sees psychiatrist q2 months. 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

## 2022-05-04 NOTE — ASSESSMENT
[FreeTextEntry1] : Ross is a 26 yo genderqueer AFAB person (they/them) with PMH of JUJU, depression  here for gender affirming hormone evaluation.\par \par #Gender dysphoria in adult:\par - Discussed options for gender-affirming hormone therapy, goals, risks (erythrocytosis, acne, male pattern hair loss, infertility) and benefits of therapy, as well as options for fertility preservation (cryopreservation of oocytes/embryos), and surgical intervention. The patient is interested in fertility preservation. Provided referral last visit. Wants to start GAHT asap\par -Expected timeline for changes to occur (cessation of menses, deep voice, increase in muscle bulk, acne, increased libido) in 3-6 months, longer term changes include male pattern hair loss, clitoral enlargement.\par -Counseled that pregnancy can still occur on GAHT and that patient must avoid pregnancy while taking GAHT \par - Discussed goals with patient including aiming for middle range of cismale testosterone  (500-700 ng/dL) vs low range based on patient's goals and need to check level q3 months during first year of therapy (then q6-12 months on stable dose)\par - pt is interested in the gel - 1 pump daily. Wants to start asap\par -Pt would like to continue OCP (Junel) for contraception possibly after starting testosterone\par \par LABS:\par - reviewed baseline labs: CBC with diff, CMP, estradiol, testosterone\par ON OCP- testosterone high - so possibly higher off OCP\par pt thinks maybe they had irregular menses prior to OCP\par Off OCP x 8 weeks - Discussed likely differential of PCOS but diagnosis of exclusion and unable to assess while on OCP. \par will repeat androgens, lh/fsh, testosterone, estradiol, PRL/TFTs, SHBG off of OCP today \par \par HEALTH MAINTENANCE:\par - pap UTD 2/2021\par - BRCA+, s/p bilateral mastectomy\par \par Discussed that there is a lack of long-term population data on the risks of GAHT in BRCA+ transmasculine patients. Overall limited data. There are case reports describing ovarian/endometrial cancer risks - it is possible that androgens through reducing endometrial proliferation/ovulation can lead to lower rates of endometrial/ovarian cancer. \par For patients who had underwent risk-reducing mastectomy, pt's require yearly clinical exams and serial monitoring if no salpingo-oophorectomy (via transvaginal vs. transrectal US).\par \par Mecca Chakraborty MD MSCI; Eliu Sorto MD MPH; Armando Lindsey MD; Williams Pineda MD Creating Breast and Gynecologic Cancer Guidelines for Transgender Patients With BRCA Mutations, Obstetrics & Gynecology: December 2021 - Volume 138 - Issue 6 - p 911-917\par doi: 10.1097/[id number] \par \par #Vit d deficiency\par -vit d replete in 10/2021\par - continue OTC vit D\par \par Return for follow up visit in 3 months.\par pt contact: [phone]\par  \par

## 2022-05-13 ENCOUNTER — TRANSCRIPTION ENCOUNTER (OUTPATIENT)
Age: 27
End: 2022-05-13

## 2022-05-18 ENCOUNTER — NON-APPOINTMENT (OUTPATIENT)
Age: 27
End: 2022-05-18

## 2022-05-20 ENCOUNTER — TRANSCRIPTION ENCOUNTER (OUTPATIENT)
Age: 27
End: 2022-05-20

## 2022-05-23 ENCOUNTER — TRANSCRIPTION ENCOUNTER (OUTPATIENT)
Age: 27
End: 2022-05-23

## 2022-05-24 LAB
17OHP SERPL-MCNC: 74 NG/DL
ALBUMIN SERPL ELPH-MCNC: 4.6 G/DL
ALP BLD-CCNC: 97 U/L
ALT SERPL-CCNC: 17 U/L
ANDROST SERPL-MCNC: 345 NG/DL
ANION GAP SERPL CALC-SCNC: 14 MMOL/L
AST SERPL-CCNC: 20 U/L
BILIRUB SERPL-MCNC: 0.2 MG/DL
BUN SERPL-MCNC: 11 MG/DL
CALCIUM SERPL-MCNC: 9.5 MG/DL
CHLORIDE SERPL-SCNC: 105 MMOL/L
CO2 SERPL-SCNC: 21 MMOL/L
CREAT SERPL-MCNC: 0.77 MG/DL
DHEA-SULFATE, SERUM: 492 UG/DL
EGFR: 110 ML/MIN/1.73M2
ESTRADIOL SERPL-MCNC: 43 PG/ML
FSH SERPL-MCNC: 5.5 IU/L
GLUCOSE SERPL-MCNC: 97 MG/DL
HCG SERPL-MCNC: <1 MIU/ML
LH SERPL-ACNC: 23.3 IU/L
POTASSIUM SERPL-SCNC: 4.4 MMOL/L
PROLACTIN SERPL-MCNC: 10.1 NG/ML
PROT SERPL-MCNC: 6.8 G/DL
SHBG-ESOTERIX: 38.1 NMOL/L
SODIUM SERPL-SCNC: 140 MMOL/L
TESTOST FREE SERPL-MCNC: 6.8 PG/ML
TESTOST SERPL-MCNC: 76.7 NG/DL
TSH SERPL-ACNC: 1.34 UIU/ML

## 2022-05-25 ENCOUNTER — RESULT REVIEW (OUTPATIENT)
Age: 27
End: 2022-05-25

## 2022-08-03 ENCOUNTER — TRANSCRIPTION ENCOUNTER (OUTPATIENT)
Age: 27
End: 2022-08-03

## 2022-08-11 ENCOUNTER — APPOINTMENT (OUTPATIENT)
Dept: PLASTIC SURGERY | Facility: CLINIC | Age: 27
End: 2022-08-11

## 2022-08-11 VITALS
BODY MASS INDEX: 30.48 KG/M2 | TEMPERATURE: 98 F | HEIGHT: 63 IN | OXYGEN SATURATION: 94 % | HEART RATE: 81 BPM | SYSTOLIC BLOOD PRESSURE: 115 MMHG | WEIGHT: 172 LBS | DIASTOLIC BLOOD PRESSURE: 78 MMHG

## 2022-08-11 PROCEDURE — XXXXX: CPT | Mod: 1L

## 2022-08-11 NOTE — REASON FOR VISIT
[Follow-Up: _____] : a [unfilled] follow-up visit [FreeTextEntry1] : s/p revision b/l reconstructed chests/ breast with liposuction, fat grating DOS 07/05/21.

## 2022-08-17 ENCOUNTER — APPOINTMENT (OUTPATIENT)
Dept: PSYCHIATRY | Facility: CLINIC | Age: 27
End: 2022-08-17

## 2022-08-17 ENCOUNTER — APPOINTMENT (OUTPATIENT)
Dept: TRANSGENDER CARE | Facility: CLINIC | Age: 27
End: 2022-08-17

## 2022-08-17 VITALS
DIASTOLIC BLOOD PRESSURE: 80 MMHG | SYSTOLIC BLOOD PRESSURE: 110 MMHG | HEART RATE: 86 BPM | HEIGHT: 63 IN | BODY MASS INDEX: 30.12 KG/M2 | TEMPERATURE: 98 F | OXYGEN SATURATION: 97 % | WEIGHT: 170 LBS

## 2022-08-17 PROCEDURE — 99214 OFFICE O/P EST MOD 30 MIN: CPT

## 2022-08-23 ENCOUNTER — NON-APPOINTMENT (OUTPATIENT)
Age: 27
End: 2022-08-23

## 2022-08-23 ENCOUNTER — TRANSCRIPTION ENCOUNTER (OUTPATIENT)
Age: 27
End: 2022-08-23

## 2022-08-25 LAB
ALBUMIN SERPL ELPH-MCNC: 4.4 G/DL
ALP BLD-CCNC: 70 U/L
ALT SERPL-CCNC: 9 U/L
ANION GAP SERPL CALC-SCNC: 13 MMOL/L
AST SERPL-CCNC: 15 U/L
BASOPHILS # BLD AUTO: 0.08 K/UL
BASOPHILS NFR BLD AUTO: 1.1 %
BILIRUB SERPL-MCNC: 0.2 MG/DL
BUN SERPL-MCNC: 14 MG/DL
CALCIUM SERPL-MCNC: 9.8 MG/DL
CHLORIDE SERPL-SCNC: 103 MMOL/L
CO2 SERPL-SCNC: 23 MMOL/L
CREAT SERPL-MCNC: 0.79 MG/DL
EGFR: 106 ML/MIN/1.73M2
EOSINOPHIL # BLD AUTO: 0.13 K/UL
EOSINOPHIL NFR BLD AUTO: 1.8 %
ESTRADIOL SERPL-MCNC: 15 PG/ML
GLUCOSE SERPL-MCNC: 88 MG/DL
HCT VFR BLD CALC: 40.4 %
HGB BLD-MCNC: 13.1 G/DL
IMM GRANULOCYTES NFR BLD AUTO: 0.1 %
LYMPHOCYTES # BLD AUTO: 3.26 K/UL
LYMPHOCYTES NFR BLD AUTO: 46.4 %
MAN DIFF?: NORMAL
MCHC RBC-ENTMCNC: 27.2 PG
MCHC RBC-ENTMCNC: 32.4 GM/DL
MCV RBC AUTO: 84 FL
MONOCYTES # BLD AUTO: 0.59 K/UL
MONOCYTES NFR BLD AUTO: 8.4 %
NEUTROPHILS # BLD AUTO: 2.96 K/UL
NEUTROPHILS NFR BLD AUTO: 42.2 %
PLATELET # BLD AUTO: 349 K/UL
POTASSIUM SERPL-SCNC: 4.4 MMOL/L
PROT SERPL-MCNC: 6.6 G/DL
RBC # BLD: 4.81 M/UL
RBC # FLD: 13.4 %
SODIUM SERPL-SCNC: 139 MMOL/L
TESTOST SERPL-MCNC: 179 NG/DL
WBC # FLD AUTO: 7.03 K/UL

## 2022-09-14 ENCOUNTER — TRANSCRIPTION ENCOUNTER (OUTPATIENT)
Age: 27
End: 2022-09-14

## 2022-09-14 NOTE — HISTORY OF PRESENT ILLNESS
[FreeTextEntry1] : CC: Gender affirming hormone therapy\par HPI: Ross is a 26 yo genderqueer AFAB person (they/them) with PMH of JUJU, depression  here for gender affirming hormone follow up.\par  \par name: Ross\par pronouns: they/them\par Gender identity: non-binary\par \par Transition history: as per visit with NP Ileana eLy\par "Patient states that they came out in 2016 as agender but was unsure if that was the correct feeling. I few years later felt that they identified as non-binary. Had double mastectomy 3/2021 due to BRCA gene inheritance and felt like this was affirming and right after having a mastectomy. Has never been on GHT, no other medical interventions. Has transitioned socially by using preferred name and neutral pronouns. Feels supported by most family and friends are accepting."\par Mother has metastatic breast cancer.\par \par \par Menstrual hx: \par Menarche: 12-16 yo\par Regular, then when on OCP since high school. On Junel Mom has PCOS. \par Off ocp with evidence of biochemical hyperandrogenism  - elevated androstenedione, DHEAS (no clinical concern for cushings). DHEAS noted to be 496 off ocp, suspected to be due to PCOS. As level was <500 no adrenal imaging warranted\par \par Dysphoria from midsection/hips, voice, face. \par Pt stopped OCP>8 weeks and baseline hormones levels assessed prior to GAHT. \par Started on GAHT (testosterone 1 pump gel daily) in 5/2022.\par Pt has been happy with results of GAHT so far. Reports slight deepening of voice and increase in facial hair. \par Per recommendation of GYN, pt took provera to induce period 21 days ago and resumed OCP thereafter given concern for increase risk of cancer without menses per pt per GYN. States period was light, lasted one day. Back on OCP for about one month. Due for next period tomorrow 8/18. Reports feels ok with continuing to have menses and that it is not a major cause of dysphoria for them.\par Used to bind prior to mastectomy, no packing\par No future surgeries planned\par Interested possibly in biological children. Previously received referrals for fertility preservation, currently not interested.\par Has concerns about BRCA and testosterone, as well as ability to carry pregnancy if they changed their mind later, and how hormone levels would be affected if testosterone was stopped.\par \par  \par Age-appropriate cancer screening:\par Last pap smear: 2/2021 normal\par Last mammography: s/p bilateral mastectomy due to BRCA+\par Last Colonoscopy: n/a\par  \par FHx: Reports metastatic breast cancer in mom; denies VTE, CAD/CVA, colon cancer\par  \par SH:\par No cigarettes. Social etoh. Denies drug use\par Sees therapist weekly, sees psychiatrist q2 months. 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

## 2022-09-14 NOTE — HISTORY OF PRESENT ILLNESS
[FreeTextEntry1] : CC: Gender affirming hormone therapy\par HPI: Ross is a 28 yo genderqueer AFAB person (they/them) with PMH of JUJU, depression  here for gender affirming hormone follow up.\par  \par name: Ross\par pronouns: they/them\par Gender identity: non-binary\par \par Transition history: as per visit with NP Ileana Ley\par "Patient states that they came out in 2016 as agender but was unsure if that was the correct feeling. I few years later felt that they identified as non-binary. Had double mastectomy 3/2021 due to BRCA gene inheritance and felt like this was affirming and right after having a mastectomy. Has never been on GHT, no other medical interventions. Has transitioned socially by using preferred name and neutral pronouns. Feels supported by most family and friends are accepting."\par Mother has metastatic breast cancer.\par \par \par Menstrual hx: \par Menarche: 12-14 yo\par Regular, then when on OCP since high school. On Junel Mom has PCOS. \par Off ocp with evidence of biochemical hyperandrogenism  - elevated androstenedione, DHEAS (no clinical concern for cushings). DHEAS noted to be 496 off ocp, suspected to be due to PCOS. As level was <500 no adrenal imaging warranted\par \par Dysphoria from midsection/hips, voice, face. \par Pt stopped OCP>8 weeks and baseline hormones levels assessed prior to GAHT. \par Started on GAHT (testosterone 1 pump gel daily) in 5/2022.\par Pt has been happy with results of GAHT so far. Reports slight deepening of voice and increase in facial hair. \par Per recommendation of GYN, pt took provera to induce period 21 days ago and resumed OCP thereafter given concern for increase risk of cancer without menses per pt per GYN. States period was light, lasted one day. Back on OCP for about one month. Due for next period tomorrow 8/18. Reports feels ok with continuing to have menses and that it is not a major cause of dysphoria for them.\par Used to bind prior to mastectomy, no packing\par No future surgeries planned\par Interested possibly in biological children. Previously received referrals for fertility preservation, currently not interested.\par Has concerns about BRCA and testosterone, as well as ability to carry pregnancy if they changed their mind later, and how hormone levels would be affected if testosterone was stopped.\par \par  \par Age-appropriate cancer screening:\par Last pap smear: 2/2021 normal\par Last mammography: s/p bilateral mastectomy due to BRCA+\par Last Colonoscopy: n/a\par  \par FHx: Reports metastatic breast cancer in mom; denies VTE, CAD/CVA, colon cancer\par  \par SH:\par No cigarettes. Social etoh. Denies drug use\par Sees therapist weekly, sees psychiatrist q2 months. 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

## 2022-09-14 NOTE — ASSESSMENT
[FreeTextEntry1] : Ross is a 28 yo genderqueer AFAB person (they/them) with PMH of JUJU, depression  here for follow up of gender affirming hormone therapy\par \par #Gender dysphoria in adult:\par - Previously discussed options for gender-affirming hormone therapy, goals, risks (erythrocytosis, acne, male pattern hair loss, infertility) and benefits of therapy, as well as options for fertility preservation (cryopreservation of oocytes/embryos), and surgical intervention. The patient prefers to remain on testosterone gel. Referral provided for fertility preservation in the past. Pt not currently interested in fertility preservation. \par -Expected timeline for changes to occur (cessation of menses, deep voice, increase in muscle bulk, acne, increased libido) in 3-6 months, longer term changes include male pattern hair loss, clitoral enlargement.\par -Counseled that pregnancy can still occur on GAHT and that patient must avoid pregnancy while taking GAHT \par - Discussed goals with patient including aiming for middle range of cismale testosterone  (500-700 ng/dL) vs low range based on patient's goals and need to check level q3 months during first year of therapy (then q6-12 months on stable dose)\par -Pt is currently on OCP per GYN recommendation due to concern for increase risk of GYN cancer given BRCA+ hx per pt. Unclear benefit of being on OCP when on testosterone therapy as testosterone suppresses estrogen with goal to stop menses. Will call GYN to discuss. Pt would like to continue OCP (Junel) for contraception at this time anyway\par - Interested in finding new psychiatrist/therapist - referral list provided\par \par RX:\par - continue testosterone gel 1 pump daily\par \par LABS:\par will check labs today: CBC, CMP, estradiol, testosterone and adjust testosterone as needed\par \par HEALTH MAINTENANCE:\par - pap UTD 2/2021\par - BRCA+, s/p bilateral mastectomy\par \par Discussed that there is a lack of long-term population data on the risks of GAHT in BRCA+ transmasculine patients. Overall limited data. There are case reports describing ovarian/endometrial cancer risks - it is possible that androgens through reducing endometrial proliferation/ovulation can lead to lower rates of endometrial/ovarian cancer. For patients who had underwent risk-reducing mastectomy, pt's require yearly clinical exams and serial monitoring if no salpingo-oophorectomy (via transvaginal vs. transrectal US).\par \par Mecca Chakraborty MD MSCI; Eliu Sorto MD MPH; Armando Lindsey MD; Williams Pineda MD Creating Breast and Gynecologic Cancer Guidelines for Transgender Patients With BRCA Mutations, Obstetrics & Gynecology: December 2021 - Volume 138 - Issue 6 - p [phone]\par doi: [email or web address]/[id number] \par \par #PCOS:\par evidence of biochemical hyperandrogenism when off ocp  - elevated androstenedione, DHEAS (no clinical concern for cushings). DHEAS noted to be 496 off ocp, suspected to be due to PCOS. As level was <500 no adrenal imaging warranted\par - no further workup today\par \par #Vit d deficiency\par -vit d replete in 10/2021\par - continue OTC vit D\par \par Return for follow up visit in 3 months.\par pt contact: [phone]\par  \par

## 2022-09-14 NOTE — END OF VISIT
[Time Spent: ___ minutes] : I have spent [unfilled] minutes of time on the encounter. [] : Fellow [FreeTextEntry3] : agree with plan of care as above. Labs today. Pt was resumed on OCP by GYN - continue for now, will have to interpret labs on ocp

## 2022-09-14 NOTE — ADDENDUM
[FreeTextEntry1] : sent email to pt's GYN Dr. Casey to discuss overall plan\par \par addendum - discussed plan with Dr. Casey - given limited evidence on testosterone use in pt's with BRCA hx, and with hx of pcos, will continue OCP

## 2022-09-14 NOTE — ASSESSMENT
[FreeTextEntry1] : Ross is a 28 yo genderqueer AFAB person (they/them) with PMH of JUJU, depression  here for follow up of gender affirming hormone therapy\par \par #Gender dysphoria in adult:\par - Previously discussed options for gender-affirming hormone therapy, goals, risks (erythrocytosis, acne, male pattern hair loss, infertility) and benefits of therapy, as well as options for fertility preservation (cryopreservation of oocytes/embryos), and surgical intervention. The patient prefers to remain on testosterone gel. Referral provided for fertility preservation in the past. Pt not currently interested in fertility preservation. \par -Expected timeline for changes to occur (cessation of menses, deep voice, increase in muscle bulk, acne, increased libido) in 3-6 months, longer term changes include male pattern hair loss, clitoral enlargement.\par -Counseled that pregnancy can still occur on GAHT and that patient must avoid pregnancy while taking GAHT \par - Discussed goals with patient including aiming for middle range of cismale testosterone  (500-700 ng/dL) vs low range based on patient's goals and need to check level q3 months during first year of therapy (then q6-12 months on stable dose)\par -Pt is currently on OCP per GYN recommendation due to concern for increase risk of GYN cancer given BRCA+ hx per pt. Unclear benefit of being on OCP when on testosterone therapy as testosterone suppresses estrogen with goal to stop menses. Will call GYN to discuss. Pt would like to continue OCP (Junel) for contraception at this time anyway\par - Interested in finding new psychiatrist/therapist - referral list provided\par \par RX:\par - continue testosterone gel 1 pump daily\par \par LABS:\par will check labs today: CBC, CMP, estradiol, testosterone and adjust testosterone as needed\par \par HEALTH MAINTENANCE:\par - pap UTD 2/2021\par - BRCA+, s/p bilateral mastectomy\par \par Discussed that there is a lack of long-term population data on the risks of GAHT in BRCA+ transmasculine patients. Overall limited data. There are case reports describing ovarian/endometrial cancer risks - it is possible that androgens through reducing endometrial proliferation/ovulation can lead to lower rates of endometrial/ovarian cancer. For patients who had underwent risk-reducing mastectomy, pt's require yearly clinical exams and serial monitoring if no salpingo-oophorectomy (via transvaginal vs. transrectal US).\par \par Mecca Chakraborty MD MSCI; Eliu Sorto MD MPH; Armando Lindsey MD; Williams Pineda MD Creating Breast and Gynecologic Cancer Guidelines for Transgender Patients With BRCA Mutations, Obstetrics & Gynecology: December 2021 - Volume 138 - Issue 6 - p [phone]\par doi: [email or web address]/[id number] \par \par #PCOS:\par evidence of biochemical hyperandrogenism when off ocp  - elevated androstenedione, DHEAS (no clinical concern for cushings). DHEAS noted to be 496 off ocp, suspected to be due to PCOS. As level was <500 no adrenal imaging warranted\par - no further workup today\par \par #Vit d deficiency\par -vit d replete in 10/2021\par - continue OTC vit D\par \par Return for follow up visit in 3 months.\par pt contact: [phone]\par  \par

## 2022-09-16 ENCOUNTER — TRANSCRIPTION ENCOUNTER (OUTPATIENT)
Age: 27
End: 2022-09-16

## 2022-09-28 ENCOUNTER — APPOINTMENT (OUTPATIENT)
Dept: PSYCHIATRY | Facility: CLINIC | Age: 27
End: 2022-09-28

## 2022-09-28 PROCEDURE — 99214 OFFICE O/P EST MOD 30 MIN: CPT

## 2022-11-09 ENCOUNTER — APPOINTMENT (OUTPATIENT)
Dept: PSYCHIATRY | Facility: CLINIC | Age: 27
End: 2022-11-09

## 2022-11-09 PROCEDURE — 99214 OFFICE O/P EST MOD 30 MIN: CPT

## 2022-11-12 NOTE — HISTORY OF PRESENT ILLNESS
[FreeTextEntry1] : The patient states they are a little more anxious than last visit, back in their apartment, getting things together to sell mom's house. States sister also involved with the process and other family and  are helping. They report missing mother and feel sad then, but no pervasive sad mood. They are sleeping and eating good. They report they feel that the anxiety is not affecting daily function and appropriate for the situation. \par \par

## 2022-11-12 NOTE — DISCUSSION/SUMMARY
[FreeTextEntry1] : The patient is a 26-year-old woman with familial predisposition, reports history of symptoms of anxiety and depression since she was a teenager with symptoms consistent with major depressive disorder, recurrent and anxiety disorder, eating disorder not otherwise specified and trouble focusing, more likely from anxiety/depression and unlikely from an attention deficit disorder as it appears she had poor academic performance during episodes of depression. \par \par 07/21/2021: As noted above patient had a trial for ADHD sx for suspected ADHD before 2021, but did not tolerate and since she came to be under care of Dr. Russ, she did not appear to meet criteria for ADHD, and as such that diagnosis is ruled out, and also noted she had few medication trials, with some benefit, and psychosocial factors appear to be significant perpetuating factors and this she seems to be benefitting from therapy. At this time she reports anxiety sx are in good control. She had increased anxiety with bupropion, but she felt it helped with reducing her craving to binge eat, but she was not on Celexa, then and will try adding bupropion to Celexa nd monitor for response and tolerability \par \par 08/18/2021: Patient reports they feel anxiety and depression sx are better, and with adding bupropion, there is no increase in anxiety, energy and motivation is better, but but no reduction with craving to binge eat.Patient reports sometimes when they take bupropion later in the day it is interfering with sleep and they would like to try a higher dose of trazodone, and understand taking bupropion earlier could help with less effect on sleep\par \par 09/22/2021:They reports anxiety and depression sx remain better controlled on current med regimen and they are working of better sleep wake cycle to reduce effect of bupropion interfering with sleep \par \par 11/10/2021: They report some situation anxiety due to mother's cancer, but coping and report anxiety and depression sx remain better controlled on current medication regimen. \par \par 2/2/2022:They report symptoms of depression and anxiety for most part are pretty stable and in good control with current medications. \par \par 4/27/2022: Patient remains stable on current meds with good control of sx of depression and anxiety \par \par 8/17/2022: The patient reports no issues with hormone therapy so far however recently had some difficult news about mother's health, reports did increasing anxiety and depression symptoms though not interfering with her daily functioning at work, but reported that they are affecting them being productive at home\par \par 09/28/2022: The patient lost their mother 2 weeks ago, grieving and reported that they are coping with the loss with support of friends and family and therapy. \par \par 11/09/2022: The patient reported increased anxiety in the context of psychosocial stress, appropriate of the situation, not depressed. \par \par

## 2022-11-12 NOTE — PLAN
[No] : No [Medication education provided] : Medication education provided. [Rationale for medication choices, possible risks/precautions, benefits, alternative treatment choices, and consequences of non-treatment discussed] : Rationale for medication choices, possible risks/precautions, benefits, alternative treatment choices, and consequences of non-treatment discussed with patient/family/caregiver  [FreeTextEntry5] : Psychoeducation and supportive therapy provided, continue to monitor for stability. \par Medication: Continue Celexa 30 mg/day. \par Continue Bupropion  mg/day, side effects discussed. \par Continue trazodone 75 mg to 100 HS and melatonin 10 mg HS. \par Educated patient of importance of remaining abstinent from drugs and alcohol. \par Emergency procedures were discussed: pt. educated to call 911 or go to nearest ER for worsening of symptoms/suicidal/homicidal ideation. \par Continue individual psychotherapy to learn coping skills (outside), will collaborate and coordinate care as needed \par RTC in 4-6 weeks or earlier as needed \par Patient given opportunity to ask questions and their questions were answered and they expressed understanding and agreement with above plan.\par \par I stop not checked, no controlled substance Rx given today\par \par \par

## 2022-11-16 ENCOUNTER — APPOINTMENT (OUTPATIENT)
Dept: TRANSGENDER CARE | Facility: CLINIC | Age: 27
End: 2022-11-16

## 2022-11-16 VITALS
OXYGEN SATURATION: 98 % | HEART RATE: 94 BPM | TEMPERATURE: 97.7 F | DIASTOLIC BLOOD PRESSURE: 76 MMHG | WEIGHT: 167 LBS | SYSTOLIC BLOOD PRESSURE: 102 MMHG | HEIGHT: 63 IN | BODY MASS INDEX: 29.59 KG/M2

## 2022-11-16 DIAGNOSIS — L70.9 ACNE, UNSPECIFIED: ICD-10-CM

## 2022-11-16 PROCEDURE — 99214 OFFICE O/P EST MOD 30 MIN: CPT

## 2022-11-16 RX ORDER — CLINDAMYCIN PHOSPHATE 1 G/10ML
1 GEL TOPICAL TWICE DAILY
Qty: 1 | Refills: 1 | Status: ACTIVE | COMMUNITY
Start: 2022-11-16 | End: 1900-01-01

## 2022-11-16 NOTE — HISTORY OF PRESENT ILLNESS
[FreeTextEntry1] : CC: Gender affirming hormone therapy\par HPI: Ross is a 28 yo genderqueer, nonbinary AFAB person (they/them) with PMH of JUJU, depression, PCOS, +BRCA  here for gender affirming hormone follow up.\par \par Interval hx:\par On testo gel 2 pumps daily, was not taking daily for a little bit  after mom passed away in september from metastatic breast cancer.\par thinking about reducing to back to 1 pump, feels like changes are going to quick - ex. facial hair but also considering stopping therapy for now.\par noticing voice deepening\par bothered by acne on face\par thinking about pausing testo\par not seeing therapist regularly \par getting cyclical menstrual bleeds on OCP\par \par ---\par Transition history: as per visit with NP Ileana Ley\par "Patient states that they came out in 2016 as agender but was unsure if that was the correct feeling. I few years later felt that they identified as non-binary. Had double mastectomy 3/2021 due to BRCA gene inheritance and felt like this was affirming and right after having a mastectomy. Has never been on GHT, no other medical interventions. Has transitioned socially by using preferred name and neutral pronouns. Feels supported by most family and friends are accepting."\par Mother has metastatic breast cancer.\par \par Menstrual hx: \par Menarche: 12-14 yo\par Regular, then when on OCP since high school. On Junel Mom has PCOS. \par Off ocp with evidence of biochemical hyperandrogenism  - elevated androstenedione, DHEAS (no clinical concern for cushings). DHEAS noted to be 496 off ocp, suspected to be due to PCOS. As level was <500 no adrenal imaging warranted\par \par Dysphoria from midsection/hips, voice, face. \par Pt stopped OCP>8 weeks and baseline hormones levels assessed prior to GAHT. \par Started on GAHT (testosterone 1 pump gel daily) in 5/2022.\par Pt has been happy with results of GAHT so far. Reports slight deepening of voice and increase in facial hair. \par Per recommendation of GYN, pt took provera to induce period 21 days ago and resumed OCP thereafter given concern for increase risk of cancer without menses per pt per GYN. States period was light, lasted one day. Back on OCP.  Reports feels ok with continuing to have menses and that it is not a major cause of dysphoria for them.\par Used to bind prior to mastectomy, no packing\par No future surgeries planned\par Interested possibly in biological children. Previously received referrals for fertility preservation, currently not interested.\par Has concerns about BRCA and testosterone, as well as ability to carry pregnancy if they changed their mind later, and how hormone levels would be affected if testosterone was stopped.\par \par  \par Age-appropriate cancer screening:\par Last pap smear: 2/2021 normal\par Last mammography: s/p bilateral mastectomy due to BRCA+\par Last Colonoscopy: n/a\par  \par FHx: Reports metastatic breast cancer in mom; denies VTE, CAD/CVA, colon cancer\par  \par SH:\par No cigarettes. Social etoh. Denies drug use\par Sees therapist weekly, sees psychiatrist q2 months. 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

## 2022-11-16 NOTE — END OF VISIT
[] : Fellow [FreeTextEntry3] : agree with plan of care as above. Labs today. Pt was resumed on OCP by GYN - continue for now, will have to interpret labs on ocp [Time Spent: ___ minutes] : I have spent [unfilled] minutes of time on the encounter.

## 2022-11-16 NOTE — ASSESSMENT
[FreeTextEntry1] : Ross is a 26 yo genderqueer AFAB person (they/them) with PMH of JUJU, depression  here for follow up of gender affirming hormone therapy\par \par #Gender dysphoria in adult:\par - Previously discussed options for gender-affirming hormone therapy, goals, risks (erythrocytosis, acne, male pattern hair loss, infertility) and benefits of therapy, as well as options for fertility preservation (cryopreservation of oocytes/embryos), and surgical intervention. Referral provided for fertility preservation in the past. Pt not currently interested in fertility preservation. \par -Expected timeline for changes to occur (cessation of menses, deep voice, increase in muscle bulk, acne, increased libido) in 3-6 months, longer term changes include male pattern hair loss, clitoral enlargement.\par -Counseled that pregnancy can still occur on GAHT and that patient must avoid pregnancy while taking GAHT \par - Discussed goals with patient including aiming for middle range of cismale testosterone  (500-700 ng/dL) vs low range based on patient's goals and need to check level q3 months during first year of therapy (then q6-12 months on stable dose)\par \par RX:\par - pt would like to pause GAHT - will stop androgel at this time. Patient will let me know if they want to resume 1 pump daily\par \par LABS:\par Labs deferred, pt stopping GAHT\par \par HEALTH MAINTENANCE:\par - pap UTD 2/2021\par - BRCA+, s/p bilateral mastectomy\par \par Discussed that there is a lack of long-term population data on the risks of GAHT in BRCA+ transmasculine patients. Overall limited data. There are case reports describing ovarian/endometrial cancer risks - it is possible that androgens through reducing endometrial proliferation/ovulation can lead to lower rates of endometrial/ovarian cancer. For patients who had underwent risk-reducing mastectomy, pt's require yearly clinical exams and serial monitoring if no salpingo-oophorectomy (via transvaginal vs. transrectal US).\par \par Discussed plan with GYN Dr. Casey - given limited evidence on testosterone use in pt's with BRCA hx, and with hx of pcos, will continue OCP on or off testosterone.\par \par Mecca Chakraborty MD MSCI; Eliu Sorto MD MPH; Armando Lindsey MD; Williams Pineda MD Creating Breast and Gynecologic Cancer Guidelines for Transgender Patients With BRCA Mutations, Obstetrics & Gynecology: December 2021 - Volume 138 - Issue 6 - p [phone]\par doi: 10.1097/[id number] \par \par #PCOS:\par evidence of biochemical hyperandrogenism when off ocp  - elevated androstenedione, DHEAS (no clinical concern for cushings). DHEAS noted to be 496 off ocp, suspected to be due to PCOS. As level was <500 no adrenal imaging warranted\par - no further workup today\par \par #Vit d deficiency\par -vit d replete in 10/2021\par - continue OTC vit d\par \par #Acne\par - can start topical clindamycin gel; derm prn\par \par RTC as needed\par \par pt contact: [phone]\par  \par

## 2022-12-14 ENCOUNTER — APPOINTMENT (OUTPATIENT)
Dept: PSYCHIATRY | Facility: CLINIC | Age: 27
End: 2022-12-14

## 2022-12-14 PROCEDURE — 99214 OFFICE O/P EST MOD 30 MIN: CPT

## 2022-12-14 RX ORDER — MEDROXYPROGESTERONE ACETATE 10 MG/1
10 TABLET ORAL
Qty: 7 | Refills: 0 | Status: DISCONTINUED | COMMUNITY
Start: 2022-07-13

## 2022-12-14 RX ORDER — NITROFURANTOIN (MONOHYDRATE/MACROCRYSTALS) 25; 75 MG/1; MG/1
100 CAPSULE ORAL
Qty: 14 | Refills: 0 | Status: DISCONTINUED | COMMUNITY
Start: 2022-11-03

## 2022-12-14 RX ORDER — COVID-19 ANTIGEN TEST
KIT MISCELLANEOUS
Qty: 8 | Refills: 0 | Status: DISCONTINUED | COMMUNITY
Start: 2022-11-01

## 2022-12-14 NOTE — DISCUSSION/SUMMARY
[FreeTextEntry1] : The patient is a 26-year-old woman with familial predisposition, reports history of symptoms of anxiety and depression since she was a teenager with symptoms consistent with major depressive disorder, recurrent and anxiety disorder, eating disorder not otherwise specified and trouble focusing, more likely from anxiety/depression and unlikely from an attention deficit disorder as it appears she had poor academic performance during episodes of depression. \par \par 07/21/2021: As noted above patient had a trial for ADHD sx for suspected ADHD before 2021, but did not tolerate and since she came to be under care of Dr. Russ, she did not appear to meet criteria for ADHD, and as such that diagnosis is ruled out, and also noted she had few medication trials, with some benefit, and psychosocial factors appear to be significant perpetuating factors and this she seems to be benefitting from therapy. At this time she reports anxiety sx are in good control. She had increased anxiety with bupropion, but she felt it helped with reducing her craving to binge eat, but she was not on Celexa, then and will try adding bupropion to Celexa nd monitor for response and tolerability \par \par 08/18/2021: Patient reports they feel anxiety and depression sx are better, and with adding bupropion, there is no increase in anxiety, energy and motivation is better, but but no reduction with craving to binge eat.Patient reports sometimes when they take bupropion later in the day it is interfering with sleep and they would like to try a higher dose of trazodone, and understand taking bupropion earlier could help with less effect on sleep\par \par 09/22/2021:They reports anxiety and depression sx remain better controlled on current med regimen and they are working of better sleep wake cycle to reduce effect of bupropion interfering with sleep \par \par 11/10/2021: They report some situation anxiety due to mother's cancer, but coping and report anxiety and depression sx remain better controlled on current medication regimen. \par \par 2/2/2022:They report symptoms of depression and anxiety for most part are pretty stable and in good control with current medications. \par \par 4/27/2022: Patient remains stable on current meds with good control of sx of depression and anxiety \par \par 8/17/2022: The patient reports no issues with hormone therapy so far however recently had some difficult news about mother's health, reports did increasing anxiety and depression symptoms though not interfering with her daily functioning at work, but reported that they are affecting them being productive at home\par \par 09/28/2022: The patient lost their mother 2 weeks ago, grieving and reported that they are coping with the loss with support of friends and family and therapy. \par \par 11/09/2022: The patient reported increased anxiety in the context of psychosocial stress, appropriate of the situation, not depressed. \par \par 12/14/2022: The patient reported new really lapse of symptoms of depression and anxiety, still miss their mother and that is affecting them however feels that is not necessarily all-consuming or affecting daily functioning.\par \par

## 2022-12-14 NOTE — PLAN
[No] : No [Medication education provided] : Medication education provided. [Rationale for medication choices, possible risks/precautions, benefits, alternative treatment choices, and consequences of non-treatment discussed] : Rationale for medication choices, possible risks/precautions, benefits, alternative treatment choices, and consequences of non-treatment discussed with patient/family/caregiver  [FreeTextEntry5] : Psychoeducation and supportive therapy provided, continue to monitor for stability. \par Medication: Continue Celexa 30 mg/day. \par Continue Bupropion  mg/day, side effects discussed. \par Continue trazodone 75 mg to 100 HS and melatonin 10 mg HS. \par Educated patient of importance of remaining abstinent from drugs and alcohol. \par Emergency procedures were discussed: pt. educated to call 911 or go to nearest ER for worsening of symptoms/suicidal/homicidal ideation. \par Continue individual psychotherapy to learn coping skills (outside), will collaborate and coordinate care as needed \par RTC in 6-8 weeks or earlier as needed \par Patient given opportunity to ask questions and their questions were answered and they expressed understanding and agreement with above plan.\par \par I stop not checked, no controlled substance Rx given today\par \par \par

## 2022-12-14 NOTE — HISTORY OF PRESENT ILLNESS
[FreeTextEntry1] : The patient states they had COVID 19 infection, right after Thanksgiving holiday, returned to work after 5 days quarantine but still felt very fatigued and have cough needed another 4 days to recover, took time off from work dose 4 days.  Patient reported that they still miss their mother and that is affecting them however feels that is not necessarily all-consuming or affecting daily functioning.  They report sleep and appetite are good.  They have plans to see family members during the holidays and spend time with them for the holiday and planning to go on vacation with a friend in January to visit their grandparents in [place]. \par Patient also reported that they are taking a pause with the testosterone treatment because "a lot going on and need some time".\par The patient reported adherence to med regimen\par They denied excess ETOH use/abuse and or any other illicit drug use including cannabis.\par The patient reported that he resumed individual psychotherapy with the previous therapist every other week and finding that his helping him cope with the loss.\par \par

## 2023-01-25 ENCOUNTER — APPOINTMENT (OUTPATIENT)
Dept: PSYCHIATRY | Facility: CLINIC | Age: 28
End: 2023-01-25
Payer: COMMERCIAL

## 2023-01-25 PROCEDURE — 99214 OFFICE O/P EST MOD 30 MIN: CPT

## 2023-01-25 NOTE — PLAN
[No] : No [Medication education provided] : Medication education provided. [Rationale for medication choices, possible risks/precautions, benefits, alternative treatment choices, and consequences of non-treatment discussed] : Rationale for medication choices, possible risks/precautions, benefits, alternative treatment choices, and consequences of non-treatment discussed with patient/family/caregiver  [FreeTextEntry5] : Psychoeducation and supportive therapy provided, continue to monitor for stability. \par Medication: Continue Celexa 30 mg/day. \par Continue Bupropion  mg/day, side effects discussed. \par Continue trazodone 75 mg to 100 HS and melatonin 10 mg HS. \par Educated patient of importance of remaining abstinent from drugs and alcohol. \par Emergency procedures were discussed: pt. educated to call 911 or go to nearest ER for worsening of symptoms/suicidal/homicidal ideation. \par Continue individual psychotherapy to learn coping skills (outside), will collaborate and coordinate care as needed \par RTC in 3 months or earlier as needed \par Patient given opportunity to ask questions and their questions were answered and they expressed understanding and agreement with above plan.\par \par I stop not checked, no controlled substance Rx given today\par \par \par

## 2023-01-25 NOTE — DISCUSSION/SUMMARY
[FreeTextEntry1] : The patient is a 26-year-old woman with familial predisposition, reports history of symptoms of anxiety and depression since she was a teenager with symptoms consistent with major depressive disorder, recurrent and anxiety disorder, eating disorder not otherwise specified and trouble focusing, more likely from anxiety/depression and unlikely from an attention deficit disorder as it appears she had poor academic performance during episodes of depression. \par \par 07/21/2021: As noted above patient had a trial for ADHD sx for suspected ADHD before 2021, but did not tolerate and since she came to be under care of Dr. Russ, she did not appear to meet criteria for ADHD, and as such that diagnosis is ruled out, and also noted she had few medication trials, with some benefit, and psychosocial factors appear to be significant perpetuating factors and this she seems to be benefitting from therapy. At this time she reports anxiety sx are in good control. She had increased anxiety with bupropion, but she felt it helped with reducing her craving to binge eat, but she was not on Celexa, then and will try adding bupropion to Celexa nd monitor for response and tolerability \par \par 08/18/2021: Patient reports they feel anxiety and depression sx are better, and with adding bupropion, there is no increase in anxiety, energy and motivation is better, but but no reduction with craving to binge eat.Patient reports sometimes when they take bupropion later in the day it is interfering with sleep and they would like to try a higher dose of trazodone, and understand taking bupropion earlier could help with less effect on sleep\par \par 09/22/2021:They reports anxiety and depression sx remain better controlled on current med regimen and they are working of better sleep wake cycle to reduce effect of bupropion interfering with sleep \par \par 11/10/2021: They report some situation anxiety due to mother's cancer, but coping and report anxiety and depression sx remain better controlled on current medication regimen. \par \par 2/2/2022:They report symptoms of depression and anxiety for most part are pretty stable and in good control with current medications. \par \par 4/27/2022: Patient remains stable on current meds with good control of sx of depression and anxiety \par \par 8/17/2022: The patient reports no issues with hormone therapy so far however recently had some difficult news about mother's health, reports did increasing anxiety and depression symptoms though not interfering with her daily functioning at work, but reported that they are affecting them being productive at home\par \par 09/28/2022: The patient lost their mother 2 weeks ago, grieving and reported that they are coping with the loss with support of friends and family and therapy. \par \par 11/09/2022: The patient reported increased anxiety in the context of psychosocial stress, appropriate of the situation, not depressed. \par \par 12/14/2022: The patient reported new really lapse of symptoms of depression and anxiety, still miss their mother and that is affecting them however feels that is not necessarily all-consuming or affecting daily functioning.\par \par 1/25/2023:The patient is continuing to do well, not depressed and anxiety stable, with moments of sad mood, grieving mother's death \par

## 2023-01-25 NOTE — HISTORY OF PRESENT ILLNESS
Fue un placer atenderlo keo cuevas estadía en el hospital.     Se le enviará a casa con un inhalador con albuterol. Si tiene más dificultad para respirar, puede usar kyle inhalador cada 6 horas hasta que mejoren daphne síntomas. Si cuevas dificultad para respirar persiste a pesar del tratamiento, llame a cuevas médico de atención primaria o centro de atención de urgencia para orlin evaluación adicional.    Melia un seguimiento con cuevas médico de atención primaria dentro de 2 a 4 semanas para recibir atención continua. Aíslese y use orlin máscara alrededor de otras personas keo al menos 10 días, incluido rom, para evitar contagiar a otras personas con Covid-19.   [FreeTextEntry1] : The patient states they are "overall"  doing good.  They report having a good time with family members during the holidays.  Patient reported that they still continue to have "moments" of feeling depressed and anxious and missing her mother but denied pervasive sad mood, anhedonia.\par They report sleep and appetite are good.  He reported that they had wanted to relocate from [place] for a long time and they did not like their roommate's boyfriend and they wanted to get a pet possibly a cat but the roommate wants to talk.  The report that they are thinking of looking at transferring to a different location of [place] either in [facility] or  and find another apartment. \par The patient reported adherence to med regimen\par They denied excess ETOH use/abuse and or any other illicit drug use including cannabis.

## 2023-04-12 ENCOUNTER — APPOINTMENT (OUTPATIENT)
Dept: PSYCHIATRY | Facility: CLINIC | Age: 28
End: 2023-04-12
Payer: COMMERCIAL

## 2023-04-12 PROCEDURE — 99214 OFFICE O/P EST MOD 30 MIN: CPT

## 2023-04-12 RX ORDER — ALPRAZOLAM 0.25 MG/1
0.25 TABLET ORAL
Qty: 10 | Refills: 0 | Status: ACTIVE | COMMUNITY
Start: 2023-04-12 | End: 1900-01-01

## 2023-04-29 NOTE — HISTORY OF PRESENT ILLNESS
[FreeTextEntry1] : The patient reported increased anxiety at work. States management changed and things at work have been more stressful and customers are more difficult and demanding and emotionally challenging. They state they had anxiety attacks while at work and they had shortness of breath.They report that they feel anxious and stressed when not at work. They report sleep and appetite are good. \par The patient reported adherence to med regimen\par They denied excess ETOH use/abuse and or any other illicit drug use including cannabis.

## 2023-04-29 NOTE — PLAN
[No] : No [Medication education provided] : Medication education provided. [Rationale for medication choices, possible risks/precautions, benefits, alternative treatment choices, and consequences of non-treatment discussed] : Rationale for medication choices, possible risks/precautions, benefits, alternative treatment choices, and consequences of non-treatment discussed with patient/family/caregiver  [FreeTextEntry5] : Psychoeducation and supportive therapy provided, and recommended med changes \par Medication: Increase Celexa dose to 40 mg/day. \par Xanax 0.25 mg once a day as needed for severe anxiety \par Continue Bupropion  mg/day, side effects discussed. \par Continue trazodone 75 mg to 100 HS and melatonin 10 mg HS. \par Educated patient of importance of remaining abstinent from drugs and alcohol. \par Emergency procedures were discussed: pt. educated to call 911 or go to nearest ER for worsening of symptoms/suicidal/homicidal ideation. \par Continue individual psychotherapy to learn coping skills (outside), will collaborate and coordinate care as needed \par RTC in 4-6 weeks or earlier as needed \par Patient given opportunity to ask questions and their questions were answered and they expressed understanding and agreement with above plan.\par \par I stop checked today: Reference #: [id number]\par Rx Written	Rx Dispensed	Drug	Quantity	Days Supply	Prescriber Name	Prescriber Pamela #	Payment Method	Dispenser\par 09/23/2022	10/15/2022	testosterone 1.62% gel pump	75gm	30	Aimee Pitts	[id number]	Insurance	[facility] #[id number]\par 08/03/2022	08/05/2022	testosterone 1.62% gel pump	75gm	60	Aimee Pitts	[id number]	Insurance	[facility] #[id number]\par 05/23/2022	05/25/2022	testosterone 1.62% gel pump	75gm	30	Aimee Pitts	[id number]	Insurance	[facility] #[id number]\par \par

## 2023-04-29 NOTE — DISCUSSION/SUMMARY
[FreeTextEntry1] : The patient is a 26-year-old woman with familial predisposition, reports history of symptoms of anxiety and depression since she was a teenager with symptoms consistent with major depressive disorder, recurrent and anxiety disorder, eating disorder not otherwise specified and trouble focusing, more likely from anxiety/depression and unlikely from an attention deficit disorder as it appears she had poor academic performance during episodes of depression. \par \par 07/21/2021: As noted above patient had a trial for ADHD sx for suspected ADHD before 2021, but did not tolerate and since she came to be under care of Dr. Russ, she did not appear to meet criteria for ADHD, and as such that diagnosis is ruled out, and also noted she had few medication trials, with some benefit, and psychosocial factors appear to be significant perpetuating factors and this she seems to be benefitting from therapy. At this time she reports anxiety sx are in good control. She had increased anxiety with bupropion, but she felt it helped with reducing her craving to binge eat, but she was not on Celexa, then and will try adding bupropion to Celexa nd monitor for response and tolerability \par \par 08/18/2021: Patient reports they feel anxiety and depression sx are better, and with adding bupropion, there is no increase in anxiety, energy and motivation is better, but but no reduction with craving to binge eat.Patient reports sometimes when they take bupropion later in the day it is interfering with sleep and they would like to try a higher dose of trazodone, and understand taking bupropion earlier could help with less effect on sleep\par \par 09/22/2021:They reports anxiety and depression sx remain better controlled on current med regimen and they are working of better sleep wake cycle to reduce effect of bupropion interfering with sleep \par \par 11/10/2021: They report some situation anxiety due to mother's cancer, but coping and report anxiety and depression sx remain better controlled on current medication regimen. \par \par 2/2/2022:They report symptoms of depression and anxiety for most part are pretty stable and in good control with current medications. \par \par 4/27/2022: Patient remains stable on current meds with good control of sx of depression and anxiety \par \par 8/17/2022: The patient reports no issues with hormone therapy so far however recently had some difficult news about mother's health, reports did increasing anxiety and depression symptoms though not interfering with her daily functioning at work, but reported that they are affecting them being productive at home\par \par 09/28/2022: The patient lost their mother 2 weeks ago, grieving and reported that they are coping with the loss with support of friends and family and therapy. \par \par 11/09/2022: The patient reported increased anxiety in the context of psychosocial stress, appropriate of the situation, not depressed. \par \par 12/14/2022: The patient reported new really lapse of symptoms of depression and anxiety, still miss their mother and that is affecting them however feels that is not necessarily all-consuming or affecting daily functioning.\par \par 1/25/2023:The patient is continuing to do well, not depressed and anxiety stable, with moments of sad mood, grieving mother's death \par \par 4/12/2023: The patient reported increased anxiety anxiety and finding it difficult to cope with changes at work and this affecting them outside work as well. \par

## 2023-04-29 NOTE — PHYSICAL EXAM
[None] : none [Average] : average [Cooperative] : cooperative [Clear] : clear [Linear/Goal Directed] : linear/goal directed [WNL] : within normal limits [None Reported] : none reported [FreeTextEntry8] : "okay"  [de-identified] : slightly anxious  [FreeTextEntry7] : No SI/HI

## 2023-05-10 NOTE — H&P PST ADULT - ANTERIOR CERVICAL R
Patient and/or family announced that they are leaving. They were advised to stay, advised to return if worse./Physician notified
normal

## 2023-05-17 ENCOUNTER — APPOINTMENT (OUTPATIENT)
Dept: PSYCHIATRY | Facility: CLINIC | Age: 28
End: 2023-05-17
Payer: COMMERCIAL

## 2023-05-17 PROCEDURE — 99214 OFFICE O/P EST MOD 30 MIN: CPT

## 2023-05-19 NOTE — PLAN
[No] : No [Medication education provided] : Medication education provided. [Rationale for medication choices, possible risks/precautions, benefits, alternative treatment choices, and consequences of non-treatment discussed] : Rationale for medication choices, possible risks/precautions, benefits, alternative treatment choices, and consequences of non-treatment discussed with patient/family/caregiver  [FreeTextEntry5] : Psychoeducation and supportive therapy provided, and recommended med changes \par Medication: Increase Celexa dose to 40 mg/day. \par Xanax 0.25 mg once a day as needed for severe anxiety \par Continue Bupropion  mg/day, side effects discussed. \par Continue trazodone 75 mg to 100 HS and melatonin 10 mg HS. \par Educated patient of importance of remaining abstinent from drugs and alcohol. \par Emergency procedures were discussed: pt. educated to call 911 or go to nearest ER for worsening of symptoms/suicidal/homicidal ideation. \par Continue individual psychotherapy to learn coping skills (outside), will collaborate and coordinate care as needed \par RTC in 2.5 months or earlier as needed \par Patient given opportunity to ask questions and their questions were answered and they expressed understanding and agreement with above plan.\par \par I stop checked today:Reference #: [id number]\par \par Cont[place] Rx in past 12 [place] Practitioner Count: 1\par Pharmacy Count: 1\par Current Opioid Prescriptions: 0\par Current Benzodiazepine Prescriptions: 0\par Current Stimulant Prescriptions: 0\par \par Patient Demographic Information (PDI)   \par PDI	First Name	Last Name	Birth Date	Gender	Street Address	[facility] Code\par A	Manisha Lopez	1995	Female	[address]	[facility]	[id number]\par \par Controlled Rx in past 12 months noted per [place]\par Prescription Information\par PDI	My Rx	Current Rx	Drug Type	Rx Written	Rx Dispensed	Drug	Quantity	Days Supply	Prescriber Name	Prescriber CAROLA #	Payment Method	Dispenser\par A	Y	N	B	04/12/2023	04/18/2023	alprazolam 0.25 mg tablet	10	10	Gretta Westfall	[id number]	Insurance	[facility] #[id number]\par A	N	N		09/23/2022	10/15/2022	testosterone 1.62% gel pump	75gm	30	Aimee Pitts	[id number]	Insurance	[facility] #[id number]\par A	N	N		08/03/2022	08/05/2022	testosterone 1.62% gel pump	75gm	60	Aimee Pitts	[id number]	Insurance	[facility] #[id number]\par A	N	N		05/23/2022	05/25/2022	testosterone 1.62% gel pump	75gm	30	Pitts, Aimee	[id number]	Insurance	[facility] #[id number]\par

## 2023-05-19 NOTE — PHYSICAL EXAM
[None] : none [Average] : average [Cooperative] : cooperative [Clear] : clear [Linear/Goal Directed] : linear/goal directed [None Reported] : none reported [WNL] : within normal limits [FreeTextEntry8] : "okay"  [de-identified] : slightly anxious  [FreeTextEntry7] : No SI/HI

## 2023-05-19 NOTE — DISCUSSION/SUMMARY
[FreeTextEntry1] : The patient is a 26-year-old woman with familial predisposition, reports history of symptoms of anxiety and depression since she was a teenager with symptoms consistent with major depressive disorder, recurrent and anxiety disorder, eating disorder not otherwise specified and trouble focusing, more likely from anxiety/depression and unlikely from an attention deficit disorder as it appears she had poor academic performance during episodes of depression. \par \par 07/21/2021: As noted above patient had a trial for ADHD sx for suspected ADHD before 2021, but did not tolerate and since she came to be under care of Dr. Russ, she did not appear to meet criteria for ADHD, and as such that diagnosis is ruled out, and also noted she had few medication trials, with some benefit, and psychosocial factors appear to be significant perpetuating factors and this she seems to be benefitting from therapy. At this time she reports anxiety sx are in good control. She had increased anxiety with bupropion, but she felt it helped with reducing her craving to binge eat, but she was not on Celexa, then and will try adding bupropion to Celexa nd monitor for response and tolerability \par \par 08/18/2021: Patient reports they feel anxiety and depression sx are better, and with adding bupropion, there is no increase in anxiety, energy and motivation is better, but but no reduction with craving to binge eat.Patient reports sometimes when they take bupropion later in the day it is interfering with sleep and they would like to try a higher dose of trazodone, and understand taking bupropion earlier could help with less effect on sleep\par \par 09/22/2021:They reports anxiety and depression sx remain better controlled on current med regimen and they are working of better sleep wake cycle to reduce effect of bupropion interfering with sleep \par \par 11/10/2021: They report some situation anxiety due to mother's cancer, but coping and report anxiety and depression sx remain better controlled on current medication regimen. \par \par 2/2/2022:They report symptoms of depression and anxiety for most part are pretty stable and in good control with current medications. \par \par 4/27/2022: Patient remains stable on current meds with good control of sx of depression and anxiety \par \par 8/17/2022: The patient reports no issues with hormone therapy so far however recently had some difficult news about mother's health, reports did increasing anxiety and depression symptoms though not interfering with her daily functioning at work, but reported that they are affecting them being productive at home\par \par 09/28/2022: The patient lost their mother 2 weeks ago, grieving and reported that they are coping with the loss with support of friends and family and therapy. \par \par 11/09/2022: The patient reported increased anxiety in the context of psychosocial stress, appropriate of the situation, not depressed. \par \par 12/14/2022: The patient reported new really lapse of symptoms of depression and anxiety, still miss their mother and that is affecting them however feels that is not necessarily all-consuming or affecting daily functioning.\par \par 1/25/2023:The patient is continuing to do well, not depressed and anxiety stable, with moments of sad mood, grieving mother's death \par \par 4/12/2023: The patient reported increased anxiety anxiety and finding it difficult to cope with changes at work and this affecting them outside work as well. \par \par 5/17/2023: The patient reported that increased dose of Celexa is helping with increased anxiety that they were experiencing at work even though not fully resolved and work environment continues to remain a stressor they feel that they are able to cope with the stress much better and have not had his anxiety attacks at work.\par

## 2023-05-19 NOTE — HISTORY OF PRESENT ILLNESS
[FreeTextEntry1] : The patient reported work continues to be a stressor and they are looking at options to transfer to another location and since increasing Celexa dose they feel that they are able to cope with the stress at work without getting overwhelmed, not having any panic attacks like previously.  The reported needing to take Xanax only once when they were traveling on the recent visit to [place]. \par They do not report any depression symptoms and Mother's Day was difficult remembering mom who passed away recently however glad that their sister who was able to come and meet her and they spend time together and glad also that they could take time off because somebody else could  that shift.\par They report sleep and appetite are good. \par The patient reported adherence to med regimen\par They denied excess ETOH use/abuse and or any other illicit drug use including cannabis.

## 2023-08-02 ENCOUNTER — APPOINTMENT (OUTPATIENT)
Dept: PSYCHIATRY | Facility: CLINIC | Age: 28
End: 2023-08-02
Payer: COMMERCIAL

## 2023-08-02 PROCEDURE — 99214 OFFICE O/P EST MOD 30 MIN: CPT

## 2023-08-02 NOTE — HISTORY OF PRESENT ILLNESS
[FreeTextEntry1] : The patient reported they have a new  at work and that has eased up more of the anxiety related to work.  They reported going to and being able to work without getting overwhelmed, not having any panic attacks since last visit.  The patient reported that they are still looking to update her resume and look for a different line of work.  Patient reported that sometimes they get frustrated with themselves because they should be reaching out more to friends and family to help out but do not and feel that they are not making the progress they would like to with looking for a job.  They report to working through these issues with the therapist. They report sleep and appetite are good.  The patient reported adherence to med regimen They denied excess ETOH use/abuse and or any other illicit drug use including cannabis. No new medical issues, no new medication since last visit And they are considering reestablishing care with the endocrinologist and go back on hormone therapy

## 2023-08-02 NOTE — PLAN
[No] : No [Medication education provided] : Medication education provided. [Rationale for medication choices, possible risks/precautions, benefits, alternative treatment choices, and consequences of non-treatment discussed] : Rationale for medication choices, possible risks/precautions, benefits, alternative treatment choices, and consequences of non-treatment discussed with patient/family/caregiver  [FreeTextEntry5] : Psychoeducation and supportive therapy provided, and recommended meds Medication: Continue Celexa dose 40 mg/day.  Xanax 0.25 mg once a day as needed for severe anxiety  Continue Bupropion  mg/day, side effects discussed.  Continue trazodone 75 mg to 100 HS and melatonin 10 mg HS.  Educated patient of importance of remaining abstinent from drugs and alcohol.  Emergency procedures were discussed: pt. educated to call 911 or go to nearest ER for worsening of symptoms/suicidal/homicidal ideation.  Continue individual psychotherapy to learn coping skills (outside), will collaborate and coordinate care as needed  RTC in 2.5 months or earlier as needed  Patient given opportunity to ask questions and their questions were answered and they expressed understanding and agreement with above plan.  I stop checked today: Reference #: [id number]  Controlled Rx in past 12 [place] noted per [place] Practitioner Count: 1 Pharmacy Count: 1 Current Opioid Prescriptions: 0 Current Benzodiazepine Prescriptions: 0 Current Stimulant Prescriptions: 0  Patient Demographic Information (PDI)    PDI	First Name	Last Name	Birth Date	Gender	Street Address	[facility] TIANA Lopez	1995	Female	[address]	[facility]	[address]  Controlled Rx in past 12 months noted per [facility] Prescription Information PDI	My Rx	Current Rx	Drug Type	Rx Written	Rx Dispensed	Drug	Quantity	Days Supply	Prescriber Name	Prescriber CAROLA #	Payment Method	Dispenser A	Y	N	B	04/12/2023	04/18/2023	alprazolam 0.25 mg tablet	10	10	Gretta Westfall	[id number]	Insurance	[facility] #[id number] A	N	N		09/23/2022	10/15/2022	testosterone 1.62% gel pump	75gm	30	Aimee Pitts	[id number]	Insurance	[facility] #[id number] A	N	N		08/03/2022	08/05/2022	testosterone 1.62% gel pump	75gm	60	Aimee Pitts	[id number]	Insurance	[facility] #[id number]

## 2023-08-02 NOTE — DISCUSSION/SUMMARY
[FreeTextEntry1] : The patient is a 26-year-old woman with familial predisposition, reports history of symptoms of anxiety and depression since she was a teenager with symptoms consistent with major depressive disorder, recurrent and anxiety disorder, eating disorder not otherwise specified and trouble focusing, more likely from anxiety/depression and unlikely from an attention deficit disorder as it appears she had poor academic performance during episodes of depression.   07/21/2021: As noted above patient had a trial for ADHD sx for suspected ADHD before 2021, but did not tolerate and since she came to be under care of Dr. Russ, she did not appear to meet criteria for ADHD, and as such that diagnosis is ruled out, and also noted she had few medication trials, with some benefit, and psychosocial factors appear to be significant perpetuating factors and this she seems to be benefitting from therapy. At this time she reports anxiety sx are in good control. She had increased anxiety with bupropion, but she felt it helped with reducing her craving to binge eat, but she was not on Celexa, then and will try adding bupropion to Celexa nd monitor for response and tolerability   08/18/2021: Patient reports they feel anxiety and depression sx are better, and with adding bupropion, there is no increase in anxiety, energy and motivation is better, but but no reduction with craving to binge eat.Patient reports sometimes when they take bupropion later in the day it is interfering with sleep and they would like to try a higher dose of trazodone, and understand taking bupropion earlier could help with less effect on sleep  09/22/2021:They reports anxiety and depression sx remain better controlled on current med regimen and they are working of better sleep wake cycle to reduce effect of bupropion interfering with sleep   11/10/2021: They report some situation anxiety due to mother's cancer, but coping and report anxiety and depression sx remain better controlled on current medication regimen.   2/2/2022:They report symptoms of depression and anxiety for most part are pretty stable and in good control with current medications.   4/27/2022: Patient remains stable on current meds with good control of sx of depression and anxiety   8/17/2022: The patient reports no issues with hormone therapy so far however recently had some difficult news about mother's health, reports did increasing anxiety and depression symptoms though not interfering with her daily functioning at work, but reported that they are affecting them being productive at home  09/28/2022: The patient lost their mother 2 weeks ago, grieving and reported that they are coping with the loss with support of friends and family and therapy.   11/09/2022: The patient reported increased anxiety in the context of psychosocial stress, appropriate of the situation, not depressed.   12/14/2022: The patient reported new really lapse of symptoms of depression and anxiety, still miss their mother and that is affecting them however feels that is not necessarily all-consuming or affecting daily functioning.  1/25/2023:The patient is continuing to do well, not depressed and anxiety stable, with moments of sad mood, grieving mother's death   4/12/2023: The patient reported increased anxiety anxiety and finding it difficult to cope with changes at work and this affecting them outside work as well.   5/17/2023: The patient reported that increased dose of Celexa is helping with increased anxiety that they were experiencing at work even though not fully resolved and work environment continues to remain a stressor they feel that they are able to cope with the stress much better and have not had his anxiety attacks at work.  8/2/2023: The patient reported that they are continuing to do better with good control of symptoms of depression and anxiety since last visit and able to cope with the stressors better

## 2023-08-02 NOTE — PHYSICAL EXAM
[None] : none [Average] : average [Cooperative] : cooperative [Clear] : clear [Linear/Goal Directed] : linear/goal directed [None Reported] : none reported [WNL] : within normal limits [Full] : full [FreeTextEntry8] : "okay"  [FreeTextEntry7] : No SI/HI

## 2023-08-18 ENCOUNTER — APPOINTMENT (OUTPATIENT)
Dept: TRANSGENDER CARE | Facility: CLINIC | Age: 28
End: 2023-08-18
Payer: COMMERCIAL

## 2023-08-18 DIAGNOSIS — R79.89 OTHER SPECIFIED ABNORMAL FINDINGS OF BLOOD CHEMISTRY: ICD-10-CM

## 2023-08-18 PROCEDURE — 99214 OFFICE O/P EST MOD 30 MIN: CPT

## 2023-08-18 NOTE — ASSESSMENT
[FreeTextEntry1] : Ross is a 26 yo genderqueer AFAB person (they/them) with PMH of JUJU, depression  here for follow up of gender affirming hormone therapy  #Gender dysphoria in adult: - Previously discussed options for gender-affirming hormone therapy, goals, risks (erythrocytosis, acne, male pattern hair loss, infertility) and benefits of therapy, as well as options for fertility preservation (cryopreservation of oocytes/embryos), and surgical intervention. Referral provided for fertility preservation in the past. Pt not currently interested in fertility preservation.  -Expected timeline for changes to occur (cessation of menses, deep voice, increase in muscle bulk, acne, increased libido) in 3-6 months, longer term changes include male pattern hair loss, clitoral enlargement. -Counseled that pregnancy can still occur on GAHT and that patient must avoid pregnancy while taking GAHT  - Discussed goals with patient including aiming for middle range of cismale testosterone  (500-700 ng/dL) vs low range based on patient's goals and need to check level q3 months during first year of therapy (then q6-12 months on stable dose)  RX: - resume testosterone gel 1.62% gel 1 pump daily   LABS: Labs next [facility]  HEALTH MAINTENANCE: - pap 2/2021 - BRCA+, s/p bilateral mastectomy  Discussed that there is a lack of long-term population data on the risks of GAHT in BRCA+ transmasculine patients. Overall limited data. There are case reports describing ovarian/endometrial cancer risks - it is possible that androgens through reducing endometrial proliferation/ovulation can lead to lower rates of endometrial/ovarian cancer. For patients who had underwent risk-reducing mastectomy, pt's require yearly clinical exams and serial monitoring if no salpingo-oophorectomy (via transvaginal vs. transrectal US).  Discussed plan with GYN Dr. Casey previously - given limited evidence on testosterone use in pt's with BRCA hx, and with hx of pcos, will continue OCP on or off testosterone.  Mecca Chakraborty MD MSCI; Eliu Sorto MD MPH; Armando Lindsey MD; Williams Pineda MD Creating Breast and Gynecologic Cancer Guidelines for Transgender Patients With BRCA Mutations, Obstetrics & Gynecology: December 2021 - Volume 138 - Issue 6 - p [phone] doi: 10.1097/[id number]   #PCOS: evidence of biochemical hyperandrogenism when off ocp  - elevated androstenedione, DHEAS (no clinical concern for cushings). DHEAS noted to be 496 off ocp, suspected to be due to PCOS. As level was <500 no adrenal imaging warranted - no further workup today - can repeat DHEAS next visit  #Vit d deficiency -vit d replete in 10/2021 - continue OTC vit d  RTC 3 months  pt contact: [phone]

## 2023-08-18 NOTE — DATA REVIEWED
[No studies available for review at this time.] : No studies available for review at this time. show

## 2023-08-18 NOTE — HISTORY OF PRESENT ILLNESS
[FreeTextEntry1] : CC: Gender affirming hormone therapy HPI: Ross is a 27 yo genderqueer, nonbinary AFAB person (they/them) with PMH of JUJU, depression, PCOS, +BRCA  here for gender affirming hormone follow up.  Transition history: as per visit with NP Ileana Ley "Patient states that they came out in 2016 as agender but was unsure if that was the correct feeling. I few years later felt that they identified as non-binary. Had double mastectomy 3/2021 due to BRCA gene inheritance and felt like this was affirming and right after having a mastectomy. Has never been on GHT, no other medical interventions. Has transitioned socially by using preferred name and neutral pronouns. Feels supported by most family and friends are accepting." Mother passed away from metastatic breast cancer.  Menstrual hx:  Menarche: 12-14 yo Regular, then when on OCP since high school. On Junel; Mom had PCOS.  Off ocp with evidence of biochemical hyperandrogenism  - elevated androstenedione, DHEAS (no clinical concern for cushings). DHEAS noted to be 496 off ocp, suspected to be due to PCOS. As level was <500 no adrenal imaging warranted Regular menses   Has new cis hetero male partner who wants to find out more about results.  Dysphoria from midsection/hips, voice, face.  Pt stopped OCP>8 weeks and baseline hormones levels assessed prior to GAHT.  Started on GAHT (testosterone 1 pump gel daily) in 2022. On GAHT, reported slight deepening of voice and increase in facial hair.  Per recommendation of GYN, pt took provera to induce period 21 days ago and resumed OCP thereafter given concern for increase risk of cancer without menses per pt per GYN. States period was light, lasted one day. Back on OCP.  Reports feels ok with continuing to have menses and that it is not a major cause of dysphoria for them. Discussed this with Gyn who prefers to keep pt on OCP which pt agrees to. Used to bind prior to mastectomy, no packing No future surgeries planned Interested possibly in biological children. Previously received referrals for fertility preservation, currently not interested. Has concerns about BRCA and testosterone, as well as ability to carry pregnancy if they changed their mind later, and how hormone levels would be affected if testosterone was stopped.  Started GAHT with testosterone gel in 2022. Was on testo gel 2 pumps daily, was not taking daily for a little bit  after mom passed away in september from metastatic breast cancer. Decided to pause GAHT after mom  in 2022, felt like changes were too quick.  Following closely with therapist now. As of 2023 ready to resume testosterone gel.  Age-appropriate cancer screening: Last pap smear: 2021 normal Last mammography: s/p bilateral mastectomy due to BRCA+ Last Colonoscopy: n/a   FHx: Reports metastatic breast cancer in mom; denies VTE, CAD/CVA, colon cancer   SH: No cigarettes. Social etoh. Denies drug use Sees therapist weekly, sees psychiatrist q2 months. Supportive friends  works at [employer]

## 2023-08-28 ENCOUNTER — TRANSCRIPTION ENCOUNTER (OUTPATIENT)
Age: 28
End: 2023-08-28

## 2023-11-08 ENCOUNTER — APPOINTMENT (OUTPATIENT)
Dept: PSYCHIATRY | Facility: CLINIC | Age: 28
End: 2023-11-08
Payer: COMMERCIAL

## 2023-11-08 PROCEDURE — 99214 OFFICE O/P EST MOD 30 MIN: CPT | Mod: 95

## 2023-11-10 ENCOUNTER — APPOINTMENT (OUTPATIENT)
Dept: TRANSGENDER CARE | Facility: CLINIC | Age: 28
End: 2023-11-10
Payer: COMMERCIAL

## 2023-11-10 VITALS
BODY MASS INDEX: 29.59 KG/M2 | DIASTOLIC BLOOD PRESSURE: 78 MMHG | OXYGEN SATURATION: 97 % | HEIGHT: 63 IN | WEIGHT: 167 LBS | HEART RATE: 88 BPM | SYSTOLIC BLOOD PRESSURE: 114 MMHG | TEMPERATURE: 98 F

## 2023-11-10 DIAGNOSIS — E55.9 VITAMIN D DEFICIENCY, UNSPECIFIED: ICD-10-CM

## 2023-11-10 PROCEDURE — 99214 OFFICE O/P EST MOD 30 MIN: CPT

## 2023-11-15 ENCOUNTER — TRANSCRIPTION ENCOUNTER (OUTPATIENT)
Age: 28
End: 2023-11-15

## 2023-11-15 LAB
25(OH)D3 SERPL-MCNC: 36.7 NG/ML
ALBUMIN SERPL ELPH-MCNC: 4.5 G/DL
ALP BLD-CCNC: 64 U/L
ALT SERPL-CCNC: 8 U/L
ANION GAP SERPL CALC-SCNC: 13 MMOL/L
AST SERPL-CCNC: 17 U/L
BASOPHILS # BLD AUTO: 0.08 K/UL
BASOPHILS NFR BLD AUTO: 1.2 %
BILIRUB SERPL-MCNC: 0.6 MG/DL
BUN SERPL-MCNC: 11 MG/DL
CALCIUM SERPL-MCNC: 9.6 MG/DL
CHLORIDE SERPL-SCNC: 104 MMOL/L
CO2 SERPL-SCNC: 20 MMOL/L
CREAT SERPL-MCNC: 0.93 MG/DL
EGFR: 87 ML/MIN/1.73M2
EOSINOPHIL # BLD AUTO: 0.18 K/UL
EOSINOPHIL NFR BLD AUTO: 2.8 %
ESTRADIOL SERPL-MCNC: 24 PG/ML
GLUCOSE SERPL-MCNC: 89 MG/DL
HCT VFR BLD CALC: 40.8 %
HGB BLD-MCNC: 13.1 G/DL
IMM GRANULOCYTES NFR BLD AUTO: 0.3 %
LYMPHOCYTES # BLD AUTO: 2.61 K/UL
LYMPHOCYTES NFR BLD AUTO: 40.7 %
MAN DIFF?: NORMAL
MCHC RBC-ENTMCNC: 27.5 PG
MCHC RBC-ENTMCNC: 32.1 GM/DL
MCV RBC AUTO: 85.5 FL
MONOCYTES # BLD AUTO: 0.49 K/UL
MONOCYTES NFR BLD AUTO: 7.6 %
NEUTROPHILS # BLD AUTO: 3.03 K/UL
NEUTROPHILS NFR BLD AUTO: 47.4 %
PLATELET # BLD AUTO: 326 K/UL
POTASSIUM SERPL-SCNC: 4.5 MMOL/L
PROT SERPL-MCNC: 6.8 G/DL
RBC # BLD: 4.77 M/UL
RBC # FLD: 13.8 %
SODIUM SERPL-SCNC: 137 MMOL/L
TESTOST SERPL-MCNC: 283 NG/DL
WBC # FLD AUTO: 6.41 K/UL

## 2023-12-03 ENCOUNTER — TRANSCRIPTION ENCOUNTER (OUTPATIENT)
Age: 28
End: 2023-12-03

## 2023-12-11 ENCOUNTER — TRANSCRIPTION ENCOUNTER (OUTPATIENT)
Age: 28
End: 2023-12-11

## 2024-01-29 ENCOUNTER — APPOINTMENT (OUTPATIENT)
Dept: PSYCHIATRY | Facility: CLINIC | Age: 29
End: 2024-01-29
Payer: COMMERCIAL

## 2024-01-29 PROCEDURE — 99214 OFFICE O/P EST MOD 30 MIN: CPT

## 2024-02-26 ENCOUNTER — APPOINTMENT (OUTPATIENT)
Dept: PSYCHIATRY | Facility: CLINIC | Age: 29
End: 2024-02-26

## 2024-02-26 ENCOUNTER — APPOINTMENT (OUTPATIENT)
Dept: PSYCHIATRY | Facility: CLINIC | Age: 29
End: 2024-02-26
Payer: COMMERCIAL

## 2024-02-26 PROCEDURE — G2211 COMPLEX E/M VISIT ADD ON: CPT | Mod: NC,1L

## 2024-02-26 PROCEDURE — 99214 OFFICE O/P EST MOD 30 MIN: CPT | Mod: 95

## 2024-02-26 NOTE — PLAN
[No] : No [Medication education provided] : Medication education provided. [Rationale for medication choices, possible risks/precautions, benefits, alternative treatment choices, and consequences of non-treatment discussed] : Rationale for medication choices, possible risks/precautions, benefits, alternative treatment choices, and consequences of non-treatment discussed with patient/family/caregiver  [FreeTextEntry5] : Psychoeducation and supportive therapy provided, and recommended meds.  Sleep hygiene education, no clock watching and get up and try.  Trazodone 75 HS and may take 100 mg prn for increased sleep onset latency.  Medication: Continue Celexa dose 40 mg/day.  Xanax 0.25 mg once a day as needed for severe anxiety  Continue Bupropion  mg/day, side effects discussed.  Continue melatonin 10 mg HS.  Educated patient of importance of remaining abstinent from drugs and alcohol.  Emergency procedures were discussed: pt. educated to call 911 or go to nearest ER for worsening of symptoms/suicidal/homicidal ideation.  Continue individual psychotherapy to learn coping skills (outside), will collaborate and coordinate care as needed  RTC in 6-8 weeks or earlier as needed  Patient given opportunity to ask questions and their questions were answered and they expressed understanding and agreement with above plan.  I stop checked today: Reference #: [id number]  [place] in [place] 12 [place] noted per [place] Practitioner Count: 1 Pharmacy Count: 1 Current Opioid Prescriptions: 0 Current Benzodiazepine Prescriptions: 0 Current Stimulant Prescriptions: 0  Patient Demographic Information (PDI)    PDI	First Name	Last Name	Birth Date	Gender	Street Address	[facility]	Zip Code TIA[address]	1995	Female	[address] [facility]	[address]  Controlled Rx in past 12 months noted per [facility] Prescription Information PDI	My Rx	Current Rx	Drug Type	Rx Written	Rx Dispensed	Drug	Quantity	Days Supply	Prescriber Name	Prescriber CAROLA #	Payment Method	Dispenser A	Y	N	B	04/12/2023	04/18/2023	alprazolam 0.25 mg tablet	10	10	Gretta Westfall	[id number]	Insurance	[facility] #[id number] A	N	N		09/23/2022	10/15/2022	testosterone 1.62% gel pump	75gm	30	Aimee Pitts	[id number]	Insurance	[facility] #[id number] A	N	N		08/03/2022	08/05/2022	testosterone 1.62% gel pump	75gm	60	Aimee Pitts	[id number]	Insurance	[facility] #[id number]

## 2024-02-26 NOTE — HISTORY OF PRESENT ILLNESS
[FreeTextEntry1] : They were advised to increase trazodone dose for poor sleep.  The patient reported they are enjoying college and balancing between work and school.  They tried trazodone 100 mg HS, gave them weird dreams, and was waking up every hour, though sleeping about 7 hours, does not feel restful.  They reduced trazodone down to 75 mg HS, still helps them fall asleep, no weird dreams anymore.  They report no severe symptoms of depression, feeling hopeless or helpless and denied passive or active SI.  No mood swings, irritability and anger since last visit.  The patient reported they used to take melatonin 10 mg along with trazodone, many years ago, did not remember if helped and or had side effects.  The patient reported adherence to med regimen.  They denied excess ETOH use/abuse and or any other illicit drug use including cannabis. Continuing to see therapist weekly.

## 2024-02-26 NOTE — DISCUSSION/SUMMARY
[FreeTextEntry1] : The patient is a 26-year-old woman with familial predisposition, reports history of symptoms of anxiety and depression since she was a teenager with symptoms consistent with major depressive disorder, recurrent and anxiety disorder, eating disorder not otherwise specified and trouble focusing, more likely from anxiety/depression and unlikely from an attention deficit disorder as it appears she had poor academic performance during episodes of depression.   07/21/2021: As noted above patient had a trial for ADHD sx for suspected ADHD before 2021, but did not tolerate and since she came to be under care of Dr. Russ, she did not appear to meet criteria for ADHD, and as such that diagnosis is ruled out, and also noted she had few medication trials, with some benefit, and psychosocial factors appear to be significant perpetuating factors and this she seems to be benefitting from therapy. At this time she reports anxiety sx are in good control. She had increased anxiety with bupropion, but she felt it helped with reducing her craving to binge eat, but she was not on Celexa, then and will try adding bupropion to Celexa nd monitor for response and tolerability   08/18/2021: Patient reports they feel anxiety and depression sx are better, and with adding bupropion, there is no increase in anxiety, energy and motivation is better, but but no reduction with craving to binge eat.Patient reports sometimes when they take bupropion later in the day it is interfering with sleep and they would like to try a higher dose of trazodone, and understand taking bupropion earlier could help with less effect on sleep  09/22/2021:They reports anxiety and depression sx remain better controlled on current med regimen and they are working of better sleep wake cycle to reduce effect of bupropion interfering with sleep   11/10/2021: They report some situation anxiety due to mother's cancer, but coping and report anxiety and depression sx remain better controlled on current medication regimen.   2/2/2022:They report symptoms of depression and anxiety for most part are pretty stable and in good control with current medications.   4/27/2022: Patient remains stable on current meds with good control of sx of depression and anxiety   8/17/2022: The patient reports no issues with hormone therapy so far however recently had some difficult news about mother's health, reports did increasing anxiety and depression symptoms though not interfering with her daily functioning at work, but reported that they are affecting them being productive at home  09/28/2022: The patient lost their mother 2 weeks ago, grieving and reported that they are coping with the loss with support of friends and family and therapy.   11/09/2022: The patient reported increased anxiety in the context of psychosocial stress, appropriate of the situation, not depressed.   12/14/2022: The patient reported new really lapse of symptoms of depression and anxiety, still miss their mother and that is affecting them however feels that is not necessarily all-consuming or affecting daily functioning.  1/25/2023:The patient is continuing to do well, not depressed and anxiety stable, with moments of sad mood, grieving mother's death   4/12/2023: The patient reported increased anxiety anxiety and finding it difficult to cope with changes at work and this affecting them outside work as well.   5/17/2023: The patient reported that increased dose of Celexa is helping with increased anxiety that they were experiencing at work even though not fully resolved and work environment continues to remain a stressor they feel that they are able to cope with the stress much better and have not had his anxiety attacks at work.  8/2/2023: The patient reported that they are continuing to do better with good control of symptoms of depression and anxiety since last visit and able to cope with the stressors better  11/8/2023: The patient reported stable mood, not depressed, not anxious, past few weeks poor sleep, mostly interrupted sleep.   1/29/2024: The patient reported overall stable mood with the no episodes of major depression, severe anxiety and started college and continuing to work full-time and continuing to experience poor sleep.  May consider a trial with increasing the trazodone dose for improving sleep.  2/26/2024: The patient reported increased trazodone dose gave them weird dreams and did not help improve sleep. Will try melatonin, with trazodone, if not helping will switch to Doxepin

## 2024-02-26 NOTE — PLAN
[No] : No [Medication education provided] : Medication education provided. [Rationale for medication choices, possible risks/precautions, benefits, alternative treatment choices, and consequences of non-treatment discussed] : Rationale for medication choices, possible risks/precautions, benefits, alternative treatment choices, and consequences of non-treatment discussed with patient/family/caregiver  [FreeTextEntry5] : Psychoeducation and supportive therapy provided, and recommended med changes and alternate med choices.  Sleep hygiene education, no clock watching and get up and try.  Continue Trazodone 75 HS, start melatonin 5-10 mg HS, if not helping will switch to doxepin.  If switching to doxepin, advised patient to reduce trazodone to 50 mg HS for a day, then 25 mg HS for one day and start doxepin 3 mg HS  Medication: Continue Celexa dose 40 mg/day.  Xanax 0.25 mg once a day as needed for severe anxiety  Continue Bupropion  mg/day, side effects discussed.  Continue melatonin 10 mg HS.  Educated patient of importance of remaining abstinent from drugs and alcohol.  Emergency procedures were discussed: pt. educated to call 911 or go to nearest ER for worsening of symptoms/suicidal/homicidal ideation.  Continue individual psychotherapy to learn coping skills (outside), will collaborate and coordinate care as needed.   RTC in 6 weeks or earlier as needed.  Patient given opportunity to ask questions and their questions were answered and they expressed understanding and agreement with above plan. Pharmacy #[id number]

## 2024-02-26 NOTE — DISCUSSION/SUMMARY
[FreeTextEntry1] : The patient is a 26-year-old woman with familial predisposition, reports history of symptoms of anxiety and depression since she was a teenager with symptoms consistent with major depressive disorder, recurrent and anxiety disorder, eating disorder not otherwise specified and trouble focusing, more likely from anxiety/depression and unlikely from an attention deficit disorder as it appears she had poor academic performance during episodes of depression.   07/21/2021: As noted above patient had a trial for ADHD sx for suspected ADHD before 2021, but did not tolerate and since she came to be under care of Dr. Russ, she did not appear to meet criteria for ADHD, and as such that diagnosis is ruled out, and also noted she had few medication trials, with some benefit, and psychosocial factors appear to be significant perpetuating factors and this she seems to be benefitting from therapy. At this time she reports anxiety sx are in good control. She had increased anxiety with bupropion, but she felt it helped with reducing her craving to binge eat, but she was not on Celexa, then and will try adding bupropion to Celexa nd monitor for response and tolerability   08/18/2021: Patient reports they feel anxiety and depression sx are better, and with adding bupropion, there is no increase in anxiety, energy and motivation is better, but but no reduction with craving to binge eat.Patient reports sometimes when they take bupropion later in the day it is interfering with sleep and they would like to try a higher dose of trazodone, and understand taking bupropion earlier could help with less effect on sleep  09/22/2021:They reports anxiety and depression sx remain better controlled on current med regimen and they are working of better sleep wake cycle to reduce effect of bupropion interfering with sleep   11/10/2021: They report some situation anxiety due to mother's cancer, but coping and report anxiety and depression sx remain better controlled on current medication regimen.   2/2/2022:They report symptoms of depression and anxiety for most part are pretty stable and in good control with current medications.   4/27/2022: Patient remains stable on current meds with good control of sx of depression and anxiety   8/17/2022: The patient reports no issues with hormone therapy so far however recently had some difficult news about mother's health, reports did increasing anxiety and depression symptoms though not interfering with her daily functioning at work, but reported that they are affecting them being productive at home  09/28/2022: The patient lost their mother 2 weeks ago, grieving and reported that they are coping with the loss with support of friends and family and therapy.   11/09/2022: The patient reported increased anxiety in the context of psychosocial stress, appropriate of the situation, not depressed.   12/14/2022: The patient reported new really lapse of symptoms of depression and anxiety, still miss their mother and that is affecting them however feels that is not necessarily all-consuming or affecting daily functioning.  1/25/2023:The patient is continuing to do well, not depressed and anxiety stable, with moments of sad mood, grieving mother's death   4/12/2023: The patient reported increased anxiety anxiety and finding it difficult to cope with changes at work and this affecting them outside work as well.   5/17/2023: The patient reported that increased dose of Celexa is helping with increased anxiety that they were experiencing at work even though not fully resolved and work environment continues to remain a stressor they feel that they are able to cope with the stress much better and have not had his anxiety attacks at work.  8/2/2023: The patient reported that they are continuing to do better with good control of symptoms of depression and anxiety since last visit and able to cope with the stressors better  11/8/2023: The patient reported stable mood, not depressed, not anxious, past few weeks poor sleep, mostly interrupted sleep.   1/29/2024: The patient reported overall stable mood with the no episodes of major depression, severe anxiety and started college and continuing to work full-time and continuing to experience poor sleep.  May consider a trial with increasing the trazodone dose for improving sleep.

## 2024-02-26 NOTE — REASON FOR VISIT
[Patient preference] : as per patient preference [Telehealth (audio & video) - Individual/Group] : This visit was provided via telehealth using real-time 2-way audio visual technology. [Medical Office: ([facility])___] : The provider was located at the medical office in [unfilled]. [Home] : The patient, [unfilled], was located at home, [unfilled], at the time of the visit. [Verbal consent obtained from patient/other participant(s)] : Verbal consent for telehealth/telephonic services obtained from patient/other participant(s) [Patient] : Patient [FreeTextEntry1] : depression and anxiety

## 2024-02-26 NOTE — REASON FOR VISIT
[Patient preference] : as per patient preference [Telehealth (audio & video) - Individual/Group] : This visit was provided via telehealth using real-time 2-way audio visual technology. [Medical Office: ([facility])___] : The provider was located at the medical office in [unfilled]. [Home] : The patient, [unfilled], was located at home, [unfilled], at the time of the visit. [Verbal consent obtained from patient/other participant(s)] : Verbal consent for telehealth/telephonic services obtained from patient/other participant(s) [Patient] : Patient [FreeTextEntry1] : depression and anxiety

## 2024-02-26 NOTE — PHYSICAL EXAM
[None] : none [Average] : average [Cooperative] : cooperative [Clear] : clear [Full] : full [Linear/Goal Directed] : linear/goal directed [None Reported] : none reported [WNL] : within normal limits [FreeTextEntry8] : "okay"  [FreeTextEntry7] : No SI/HI

## 2024-02-26 NOTE — HISTORY OF PRESENT ILLNESS
[FreeTextEntry1] : The patient reported holidays were "okay".  The patient reported that they are doing overall "all right".  They started college last week, taking 12 credits, 3 classes this semester.  They will continue working 32 hours/week.  Patient reported that her sleep is still "weird".  Patient reported that they fall asleep but have interrupted sleep and sleeping only 6 to 7 hours at night.  They reported that recently breaking up with her partner.  They report no severe symptoms of depression, feeling hopeless or helpless and denied passive or active SI.  They also did not report any major mood swings or anger or irritability.   They reported going to and being able to work without getting overwhelmed, not having any panic attacks since last visit.  They report to working through these issues with the therapist weekly.  The patient reported adherence to med regimen.  They denied excess ETOH use/abuse and or any other illicit drug use including cannabis.

## 2024-03-06 ENCOUNTER — NON-APPOINTMENT (OUTPATIENT)
Age: 29
End: 2024-03-06

## 2024-03-06 NOTE — DISCUSSION/SUMMARY
[FreeTextEntry1] : Patient reported on last visit, February 26, 2024 that the trazodone increased dose was not effective and also giving vivid dreams.  They first wanted to try melatonin and if not helping agreed to switch to doxepin.  Patient called today and reported wanting to start doxepin as discussed during last visit.  Patient was informed on the last visit that when starting doxepin they should lower the trazodone to 50 mg 1 night and then the next night to 25 mg and then stop and start doxepin 3 mg at bedtime.  Rx sent and advised patient schedule visit in 4 weeks for follow up.

## 2024-03-21 ENCOUNTER — TRANSCRIPTION ENCOUNTER (OUTPATIENT)
Age: 29
End: 2024-03-21

## 2024-03-25 ENCOUNTER — APPOINTMENT (OUTPATIENT)
Dept: PSYCHIATRY | Facility: CLINIC | Age: 29
End: 2024-03-25
Payer: COMMERCIAL

## 2024-03-25 DIAGNOSIS — F33.9 MAJOR DEPRESSIVE DISORDER, RECURRENT, UNSPECIFIED: ICD-10-CM

## 2024-03-25 DIAGNOSIS — G47.00 INSOMNIA, UNSPECIFIED: ICD-10-CM

## 2024-03-25 DIAGNOSIS — F41.9 ANXIETY DISORDER, UNSPECIFIED: ICD-10-CM

## 2024-03-25 PROCEDURE — G2211 COMPLEX E/M VISIT ADD ON: CPT | Mod: NC,1L

## 2024-03-25 PROCEDURE — 99214 OFFICE O/P EST MOD 30 MIN: CPT | Mod: 95

## 2024-03-25 RX ORDER — DOXEPIN 3 MG/1
3 TABLET, FILM COATED ORAL
Qty: 30 | Refills: 0 | Status: DISCONTINUED | COMMUNITY
Start: 2024-03-06 | End: 2024-03-25

## 2024-03-25 RX ORDER — CITALOPRAM HYDROBROMIDE 40 MG/1
40 TABLET, FILM COATED ORAL DAILY
Qty: 30 | Refills: 1 | Status: ACTIVE | COMMUNITY
Start: 1900-01-01 | End: 1900-01-01

## 2024-03-25 RX ORDER — TRAZODONE HYDROCHLORIDE 50 MG/1
50 TABLET ORAL
Qty: 30 | Refills: 1 | Status: ACTIVE | COMMUNITY
Start: 2024-03-25 | End: 1900-01-01

## 2024-03-25 RX ORDER — BUPROPION HYDROCHLORIDE 150 MG/1
150 TABLET, EXTENDED RELEASE ORAL
Qty: 30 | Refills: 1 | Status: ACTIVE | COMMUNITY
Start: 2021-07-21 | End: 1900-01-01

## 2024-03-25 NOTE — REASON FOR VISIT
[Telehealth (audio & video) - Individual/Group] : This visit was provided via telehealth using real-time 2-way audio visual technology. [Patient preference] : as per patient preference [Medical Office: ([facility])___] : The provider was located at the medical office in [unfilled]. [Verbal consent obtained from patient/other participant(s)] : Verbal consent for telehealth/telephonic services obtained from patient/other participant(s) [Home] : The patient, [unfilled], was located at home, [unfilled], at the time of the visit. [Patient] : Patient [FreeTextEntry1] : depression and anxiety

## 2024-03-25 NOTE — HISTORY OF PRESENT ILLNESS
[FreeTextEntry1] : The patient states their insurance did not approve Doxepin 3mg tabs.  They report that the insurance told him they would not cover doxepin.  Patient reported that days went back to taking trazodone 50 mg at bedtime along with the melatonin 10 mg at bedtime.  Patient reported that with this combination they have been sleeping much better and not experiencing any adverse effects. The patient reported they are continuing to enjoy college, thoughts "it's a bit stressful", and balancing between work and school.  They report no severe symptoms of depression, feeling hopeless or helpless and denied passive or active SI.  No mood swings, irritability and anger since last visit.  The patient reported adherence to med regimen.  They denied excess ETOH use/abuse and or any other illicit drug use including cannabis. Continuing to see therapist weekly.  No new medical issues, no new medication since last visit

## 2024-03-25 NOTE — PLAN
[No] : No [Rationale for medication choices, possible risks/precautions, benefits, alternative treatment choices, and consequences of non-treatment discussed] : Rationale for medication choices, possible risks/precautions, benefits, alternative treatment choices, and consequences of non-treatment discussed with patient/family/caregiver  [Medication education provided] : Medication education provided. [FreeTextEntry5] : Psychoeducation and supportive therapy provided, and recommended med changes and alternate med choices.  Sleep hygiene education, no clock watching and get up and try.  Continue Trazodone 50 HS, and melatonin 10 mg HS for sleep.  Medication: Continue Celexa dose 40 mg/day.  Xanax 0.25 mg once a day as needed for severe anxiety  Continue Bupropion  mg/day, side effects discussed.  Educated patient of importance of remaining abstinent from drugs and alcohol.  Emergency procedures were discussed: pt. educated to call 911 or go to nearest ER for worsening of symptoms/suicidal/homicidal ideation.  Continue individual psychotherapy to learn coping skills (outside), will collaborate and coordinate care as needed.   RTC in 2.5 months or earlier as needed.  Patient given opportunity to ask questions and their questions were answered and they expressed understanding and agreement with above plan.  I stop checked today: Reference #: [id number]  Practitioner Count: 0 Pharmacy Count: 0 Current Opioid Prescriptions: 0 Current Benzodiazepine Prescriptions: 0 Current Stimulant Prescriptions: 0   Patient Demographic Information (PDI)     PDI	First Name	Last Name	Birth Date	Gender	Street Address	[facility] TIANA Lopez	1995	Female	[address]	[facility]	[id number]  Prescription Information    PDI Filter:   PDI	My Rx	Current Rx	Drug Type	Rx Written	Rx Dispensed	Drug	Quantity	Days Supply	Prescriber Name	Prescriber CAROLA #	Payment Method	Dispenser A	N	N		12/11/2023	12/13/2023	testosterone 1.62% gel pump	150gm	30	Mildred Holloway M, (MSN)	[id number]	Insurance	[facility] #[id number] A	N	N		08/18/2023	08/30/2023	testosterone 1.62% gel pump	75gm	30	Aimee Pitts	[id number]	Insurance	[facility] #[id number] A	Y	N	B	04/12/2023	04/18/2023	alprazolam 0.25 mg tablet	10	10	Gretta Westfall	[id number]	Insurance	[facility] #[id number]

## 2024-03-25 NOTE — PHYSICAL EXAM
[None] : none [Average] : average [Cooperative] : cooperative [Full] : full [Linear/Goal Directed] : linear/goal directed [Clear] : clear [None Reported] : none reported [WNL] : within normal limits [Euthymic] : euthymic [FreeTextEntry7] : No SI/HI

## 2024-03-25 NOTE — DISCUSSION/SUMMARY
[FreeTextEntry1] : The patient is a 26-year-old woman with familial predisposition, reports history of symptoms of anxiety and depression since she was a teenager with symptoms consistent with major depressive disorder, recurrent and anxiety disorder, eating disorder not otherwise specified and trouble focusing, more likely from anxiety/depression and unlikely from an attention deficit disorder as it appears she had poor academic performance during episodes of depression.   07/21/2021: As noted above patient had a trial for ADHD sx for suspected ADHD before 2021, but did not tolerate and since she came to be under care of Dr. Russ, she did not appear to meet criteria for ADHD, and as such that diagnosis is ruled out, and also noted she had few medication trials, with some benefit, and psychosocial factors appear to be significant perpetuating factors and this she seems to be benefitting from therapy. At this time she reports anxiety sx are in good control. She had increased anxiety with bupropion, but she felt it helped with reducing her craving to binge eat, but she was not on Celexa, then and will try adding bupropion to Celexa nd monitor for response and tolerability   08/18/2021: Patient reports they feel anxiety and depression sx are better, and with adding bupropion, there is no increase in anxiety, energy and motivation is better, but but no reduction with craving to binge eat.Patient reports sometimes when they take bupropion later in the day it is interfering with sleep and they would like to try a higher dose of trazodone, and understand taking bupropion earlier could help with less effect on sleep  09/22/2021:They reports anxiety and depression sx remain better controlled on current med regimen and they are working of better sleep wake cycle to reduce effect of bupropion interfering with sleep   11/10/2021: They report some situation anxiety due to mother's cancer, but coping and report anxiety and depression sx remain better controlled on current medication regimen.   2/2/2022:They report symptoms of depression and anxiety for most part are pretty stable and in good control with current medications.   4/27/2022: Patient remains stable on current meds with good control of sx of depression and anxiety   8/17/2022: The patient reports no issues with hormone therapy so far however recently had some difficult news about mother's health, reports did increasing anxiety and depression symptoms though not interfering with her daily functioning at work, but reported that they are affecting them being productive at home  09/28/2022: The patient lost their mother 2 weeks ago, grieving and reported that they are coping with the loss with support of friends and family and therapy.   11/09/2022: The patient reported increased anxiety in the context of psychosocial stress, appropriate of the situation, not depressed.   12/14/2022: The patient reported new really lapse of symptoms of depression and anxiety, still miss their mother and that is affecting them however feels that is not necessarily all-consuming or affecting daily functioning.  1/25/2023:The patient is continuing to do well, not depressed and anxiety stable, with moments of sad mood, grieving mother's death   4/12/2023: The patient reported increased anxiety anxiety and finding it difficult to cope with changes at work and this affecting them outside work as well.   5/17/2023: The patient reported that increased dose of Celexa is helping with increased anxiety that they were experiencing at work even though not fully resolved and work environment continues to remain a stressor they feel that they are able to cope with the stress much better and have not had his anxiety attacks at work.  8/2/2023: The patient reported that they are continuing to do better with good control of symptoms of depression and anxiety since last visit and able to cope with the stressors better  11/8/2023: The patient reported stable mood, not depressed, not anxious, past few weeks poor sleep, mostly interrupted sleep.   1/29/2024: The patient reported overall stable mood with the no episodes of major depression, severe anxiety and started college and continuing to work full-time and continuing to experience poor sleep.  May consider a trial with increasing the trazodone dose for improving sleep.  2/26/2024: The patient reported increased trazodone dose gave them weird dreams and did not help improve sleep. Will try melatonin, with trazodone, if not helping will switch to Doxepin  3/25/2024: The patient reported doxepin was not approved so they went back to taking trazodone 50 mg and melatonin 10 mg at bedtime with good effect and they continue to have sustained improvement of symptoms of depression and anxiety and learning to cope with the stress related to school.

## 2024-05-17 ENCOUNTER — APPOINTMENT (OUTPATIENT)
Dept: TRANSGENDER CARE | Facility: CLINIC | Age: 29
End: 2024-05-17

## 2024-06-21 ENCOUNTER — APPOINTMENT (OUTPATIENT)
Dept: TRANSGENDER CARE | Facility: CLINIC | Age: 29
End: 2024-06-21
Payer: COMMERCIAL

## 2024-06-21 VITALS
HEART RATE: 80 BPM | HEIGHT: 63 IN | TEMPERATURE: 98 F | SYSTOLIC BLOOD PRESSURE: 110 MMHG | WEIGHT: 142 LBS | OXYGEN SATURATION: 98 % | BODY MASS INDEX: 25.16 KG/M2 | DIASTOLIC BLOOD PRESSURE: 64 MMHG

## 2024-06-21 DIAGNOSIS — E53.8 DEFICIENCY OF OTHER SPECIFIED B GROUP VITAMINS: ICD-10-CM

## 2024-06-21 DIAGNOSIS — F64.0 TRANSSEXUALISM: ICD-10-CM

## 2024-06-21 DIAGNOSIS — R53.82 CHRONIC FATIGUE, UNSPECIFIED: ICD-10-CM

## 2024-06-21 PROCEDURE — 99214 OFFICE O/P EST MOD 30 MIN: CPT

## 2024-06-21 PROCEDURE — 36415 COLL VENOUS BLD VENIPUNCTURE: CPT

## 2024-06-21 PROCEDURE — G2211 COMPLEX E/M VISIT ADD ON: CPT | Mod: NC

## 2024-06-23 ENCOUNTER — TRANSCRIPTION ENCOUNTER (OUTPATIENT)
Age: 29
End: 2024-06-23

## 2024-06-23 LAB
25(OH)D3 SERPL-MCNC: 43.3 NG/ML
ALBUMIN SERPL ELPH-MCNC: 4.1 G/DL
ALP BLD-CCNC: 50 U/L
ALT SERPL-CCNC: 7 U/L
ANION GAP SERPL CALC-SCNC: 14 MMOL/L
AST SERPL-CCNC: 15 U/L
BASOPHILS # BLD AUTO: 0.1 K/UL
BASOPHILS NFR BLD AUTO: 1.7 %
BILIRUB SERPL-MCNC: 0.6 MG/DL
BUN SERPL-MCNC: 10 MG/DL
CALCIUM SERPL-MCNC: 9.2 MG/DL
CHLORIDE SERPL-SCNC: 105 MMOL/L
CO2 SERPL-SCNC: 18 MMOL/L
CREAT SERPL-MCNC: 0.82 MG/DL
EGFR: 100 ML/MIN/1.73M2
EOSINOPHIL # BLD AUTO: 0.16 K/UL
EOSINOPHIL NFR BLD AUTO: 2.7 %
ESTRADIOL SERPL-MCNC: 8 PG/ML
GLUCOSE SERPL-MCNC: 82 MG/DL
HCT VFR BLD CALC: 38.8 %
HGB BLD-MCNC: 12.4 G/DL
IMM GRANULOCYTES NFR BLD AUTO: 0.2 %
LYMPHOCYTES # BLD AUTO: 2.73 K/UL
LYMPHOCYTES NFR BLD AUTO: 46.9 %
MAN DIFF?: NORMAL
MCHC RBC-ENTMCNC: 27.4 PG
MCHC RBC-ENTMCNC: 32 GM/DL
MCV RBC AUTO: 85.7 FL
MONOCYTES # BLD AUTO: 0.33 K/UL
MONOCYTES NFR BLD AUTO: 5.7 %
NEUTROPHILS # BLD AUTO: 2.49 K/UL
NEUTROPHILS NFR BLD AUTO: 42.8 %
PLATELET # BLD AUTO: 344 K/UL
POTASSIUM SERPL-SCNC: 4.3 MMOL/L
PROT SERPL-MCNC: 6.5 G/DL
RBC # BLD: 4.53 M/UL
RBC # FLD: 13.8 %
SODIUM SERPL-SCNC: 137 MMOL/L
T4 FREE SERPL-MCNC: 1.1 NG/DL
TESTOST SERPL-MCNC: 314 NG/DL
TSH SERPL-ACNC: 1.3 UIU/ML
VIT B12 SERPL-MCNC: 475 PG/ML
WBC # FLD AUTO: 5.82 K/UL

## 2024-06-23 NOTE — HISTORY OF PRESENT ILLNESS
[FreeTextEntry1] : CC: Gender affirming hormone therapy HPI: Ross is a 28 yo genderqueer, nonbinary AFAB person (they/them) with PMH of JUJU, depression, PCOS, +BRCA  here for gender affirming hormone follow up.  Transition history: as per visit with NP Ileana Ley "Patient states that they came out in 2016 as agender but was unsure if that was the correct feeling. I few years later felt that they identified as non-binary. Had double mastectomy 3/2021 due to BRCA gene inheritance and felt like this was affirming and right after having a mastectomy. Has never been on GHT, no other medical interventions. Has transitioned socially by using preferred name and neutral pronouns. Feels supported by most family and friends are accepting." Mother passed away from metastatic breast cancer.  Menstrual hx:  Menarche: 12-12 yo Regular, then when on OCP since high school. On Junel; Mom had PCOS.  Off ocp with evidence of biochemical hyperandrogenism  - elevated androstenedione, DHEAS (no clinical concern for cushings). DHEAS noted to be 496 off ocp, suspected to be due to PCOS. As level was <500 no adrenal imaging warranted Regular menses   Dysphoria from midsection/hips, voice, face.  Pt stopped OCP>8 weeks and baseline hormones levels assessed prior to GAHT.  Started on GAHT (testosterone 1 pump gel daily) in 2022. On GAHT, reported slight deepening of voice and increase in facial hair.  Per recommendation of GYN, pt took provera to induce period 21 days ago and resumed OCP thereafter given concern for increase risk of cancer without menses per pt per GYN. States period was light, lasted one day. Back on OCP.  Reports feels ok with continuing to have menses and that it is not a major cause of dysphoria for them. Discussed this with Gyn who prefers to keep pt on OCP which pt agrees to. Used to bind prior to mastectomy, no packing No future surgeries planned Interested possibly in biological children. Previously received referrals for fertility preservation, currently not interested. Has concerns about BRCA and testosterone, as well as ability to carry pregnancy if they changed their mind later, and how hormone levels would be affected if testosterone was stopped.  Started GAHT with testosterone gel in 2022. Was on testo gel 2 pumps daily, was not taking daily for a little bit  after mom passed away in september from metastatic breast cancer. Decided to pause GAHT after mom  in 2022, felt like changes were too quick. Following closely with therapist now. As of 2023 was ready to resume testosterone gel. Resumed testosterone gel 1.62% gel 1 pump daily in 2023  Current regimen: doing well, using 2 pump gel daily  Age-appropriate cancer screening: Last pap smear: 2021 normal Last mammography: s/p bilateral mastectomy due to BRCA+ Last Colonoscopy: n/a   FHx: Reports metastatic breast cancer in mom; denies VTE, CAD/CVA, colon cancer   SH: No cigarettes. Social etoh. Denies drug use Sees therapist weekly, sees psychiatrist q2 months. Supportive friends  works at [employer]  jostan hancockplied to [employer] for spring 2024- [facility]

## 2024-06-23 NOTE — ASSESSMENT
[FreeTextEntry1] : Ross is a 30 yo genderqueer AFAB person (they/them) with PMH of JUJU, depression here for follow up of gender affirming hormone therapy  #Gender dysphoria in adult: - Previously discussed options for gender-affirming hormone therapy, goals, risks (erythrocytosis, acne, male pattern hair loss, infertility) and benefits of therapy, as well as options for fertility preservation (cryopreservation of oocytes/embryos), and surgical intervention. Referral provided for fertility preservation in the past. Pt not currently interested in fertility preservation. -Expected timeline for changes to occur (cessation of menses, deep voice, increase in muscle bulk, acne, increased libido) in 3-6 months, longer term changes include male pattern hair loss, clitoral enlargement. -Counseled that pregnancy can still occur on GAHT and that patient must avoid pregnancy while taking GAHT - Discussed goals with patient including aiming for middle range of cismale testosterone (500-700 ng/dL) vs low range based on patient's goals and need to check level q3 months during first year of therapy (then q6-12 months on stable dose)  RX: - testosterone gel 1.62% gel 2 pumps daily   LABS: check labs today  HEALTH MAINTENANCE: - pap 2/2021 - BRCA+, s/p bilateral mastectomy  Previously discussed that there is a lack of long-term population data on the risks of GAHT in BRCA+ transmasculine patients. Overall limited data. There are case reports describing ovarian/endometrial cancer risks - it is possible that androgens through reducing endometrial proliferation/ovulation can lead to lower rates of endometrial/ovarian cancer. For patients who had underwent risk-reducing mastectomy, pt's require yearly clinical exams and serial monitoring if no salpingo-oophorectomy (via transvaginal vs. transrectal US).  Discussed plan with GYN Dr. Casey previously - given limited evidence on testosterone use in pt's with BRCA hx, and with hx of pcos, will continue OCP on or off testosterone.  Mecca Chakraborty MD MSCI; Eliu Sorto MD MPH; Armando Lindsey MD; Williams Pineda MD Creating Breast and Gynecologic Cancer Guidelines for Transgender Patients With BRCA Mutations, Obstetrics & Gynecology: December 2021 - Volume 138 - Issue 6 - p 911-917 doi: 10.1097/[id number]  #PCOS: evidence of biochemical hyperandrogenism when off ocp - elevated androstenedione, DHEAS (no clinical concern for cushings). DHEAS noted to be 496 off ocp, suspected to be due to PCOS. As level was <500 no adrenal imaging warranted - no further workup today  #Vit d deficiency -vit d replete in 10/2021 - continue OTC vit d  #b12 deficiency - check levels - used to get injections with pcp  RTC 6 months  pt contact: [phone].

## 2024-06-28 ENCOUNTER — TRANSCRIPTION ENCOUNTER (OUTPATIENT)
Age: 29
End: 2024-06-28

## 2024-07-03 ENCOUNTER — APPOINTMENT (OUTPATIENT)
Dept: PSYCHIATRY | Facility: CLINIC | Age: 29
End: 2024-07-03
Payer: COMMERCIAL

## 2024-07-03 DIAGNOSIS — F41.9 ANXIETY DISORDER, UNSPECIFIED: ICD-10-CM

## 2024-07-03 DIAGNOSIS — F33.9 MAJOR DEPRESSIVE DISORDER, RECURRENT, UNSPECIFIED: ICD-10-CM

## 2024-07-03 DIAGNOSIS — G47.00 INSOMNIA, UNSPECIFIED: ICD-10-CM

## 2024-07-03 DIAGNOSIS — F64.0 TRANSSEXUALISM: ICD-10-CM

## 2024-07-03 PROCEDURE — G2211 COMPLEX E/M VISIT ADD ON: CPT | Mod: NC

## 2024-07-03 PROCEDURE — 99214 OFFICE O/P EST MOD 30 MIN: CPT | Mod: 95

## 2024-07-29 ENCOUNTER — NON-APPOINTMENT (OUTPATIENT)
Age: 29
End: 2024-07-29

## 2024-07-29 ENCOUNTER — APPOINTMENT (OUTPATIENT)
Dept: ORTHOPEDIC SURGERY | Facility: CLINIC | Age: 29
End: 2024-07-29
Payer: OTHER MISCELLANEOUS

## 2024-07-29 VITALS — HEIGHT: 63 IN | WEIGHT: 142 LBS | BODY MASS INDEX: 25.16 KG/M2

## 2024-07-29 DIAGNOSIS — S80.02XA CONTUSION OF LEFT KNEE, INITIAL ENCOUNTER: ICD-10-CM

## 2024-07-29 PROCEDURE — 99203 OFFICE O/P NEW LOW 30 MIN: CPT

## 2024-07-29 PROCEDURE — 73564 X-RAY EXAM KNEE 4 OR MORE: CPT | Mod: LT

## 2024-07-29 NOTE — ASSESSMENT
[FreeTextEntry1] : Works at  [employer] and tripped and fell onto pavement injuring left knee.  Seems to be an anterior soft tissue contusion.  No mechanical symptoms x-rays unremarkable.  Reasonable to return to work continue ice and anti-inflammatories.  She will follow-up as needed.

## 2024-07-29 NOTE — HISTORY OF PRESENT ILLNESS
[Work related] : work related [Dull/Aching] : dull/aching [Localized] : localized [Household chores] : household chores [Work] : work [] : no [FreeTextEntry1] : left knee pain [FreeTextEntry3] : 7/23/24 [FreeTextEntry5] : patient was injured after falling backwards on curb. landed knee on concrete. had swelling that has gotten better  [de-identified] : kneeling

## 2024-07-29 NOTE — IMAGING
[de-identified] : Knee Exam:  Inspection: MIld anterior/medial ecchymosis Palpation: Anterior tenderness to palpation Range of motion: 0-140; anterior pain with flexion; tight hamstrings Strength: 5/5 quadriceps and hamstring strength Special testing: Negative Lachman, negative Nikita, negative patella apprehension Neuro: Motor and sensory intact distally Gait: Non-antalgic [Left] : left knee [All Views] : anteroposterior, lateral, skyline, and anteroposterior standing [There are no fractures, subluxations or dislocations. No significant abnormalities are seen] : There are no fractures, subluxations or dislocations. No significant abnormalities are seen

## 2024-08-15 ENCOUNTER — TRANSCRIPTION ENCOUNTER (OUTPATIENT)
Age: 29
End: 2024-08-15

## 2024-09-20 ENCOUNTER — APPOINTMENT (OUTPATIENT)
Dept: TRANSGENDER CARE | Facility: CLINIC | Age: 29
End: 2024-09-20

## 2024-09-20 VITALS
HEIGHT: 63 IN | HEART RATE: 82 BPM | OXYGEN SATURATION: 98 % | SYSTOLIC BLOOD PRESSURE: 100 MMHG | DIASTOLIC BLOOD PRESSURE: 78 MMHG | TEMPERATURE: 98.5 F | BODY MASS INDEX: 24.45 KG/M2 | WEIGHT: 138 LBS

## 2024-09-20 DIAGNOSIS — E55.9 VITAMIN D DEFICIENCY, UNSPECIFIED: ICD-10-CM

## 2024-09-20 DIAGNOSIS — Z23 ENCOUNTER FOR IMMUNIZATION: ICD-10-CM

## 2024-09-20 DIAGNOSIS — F64.0 TRANSSEXUALISM: ICD-10-CM

## 2024-09-20 PROCEDURE — G0008: CPT

## 2024-09-20 PROCEDURE — 90656 IIV3 VACC NO PRSV 0.5 ML IM: CPT

## 2024-09-20 PROCEDURE — 36415 COLL VENOUS BLD VENIPUNCTURE: CPT

## 2024-09-20 PROCEDURE — 99214 OFFICE O/P EST MOD 30 MIN: CPT | Mod: 25

## 2024-09-24 PROBLEM — Z23 FLU VACCINE NEED: Status: ACTIVE | Noted: 2024-09-24

## 2024-09-24 NOTE — HISTORY OF PRESENT ILLNESS
[FreeTextEntry1] : CC: Gender affirming hormone therapy HPI: Ross is a 30 yo genderqueer, nonbinary AFAB person (they/them) with PMH of JUJU, depression, PCOS, +BRCA  here for gender affirming hormone follow up.  Transition history: as per visit with NP Ileana Ley "Patient states that they came out in 2016 as agender but was unsure if that was the correct feeling. I few years later felt that they identified as non-binary. Had double mastectomy 3/2021 due to BRCA gene inheritance and felt like this was affirming and right after having a mastectomy. Has never been on GHT, no other medical interventions. Has transitioned socially by using preferred name and neutral pronouns. Feels supported by most family and friends are accepting." Mother passed away from metastatic breast cancer.  Menstrual hx:  Menarche: 12-14 yo Regular, then when on OCP since high school. On Junel; Mom had PCOS.  Off ocp with evidence of biochemical hyperandrogenism  - elevated androstenedione, DHEAS (no clinical concern for cushings). DHEAS noted to be 496 off ocp, suspected to be due to PCOS. As level was <500 no adrenal imaging warranted Regular menses   Dysphoria from midsection/hips, voice, face.  Pt stopped OCP>8 weeks and baseline hormones levels assessed prior to GAHT.  Started on GAHT (testosterone 1 pump gel daily) in 2022. On GAHT, reported slight deepening of voice and increase in facial hair.  Per recommendation of GYN, pt took provera to induce period 21 days ago and resumed OCP thereafter given concern for increase risk of cancer without menses per pt per GYN. States period was light, lasted one day. Back on OCP.  Reports feels ok with continuing to have menses and that it is not a major cause of dysphoria for them. Discussed this with Gyn who prefers to keep pt on OCP which pt agrees to. Used to bind prior to mastectomy, no packing No future surgeries planned Interested possibly in biological children. Previously received referrals for fertility preservation, currently not interested. Has concerns about BRCA and testosterone, as well as ability to carry pregnancy if they changed their mind later, and how hormone levels would be affected if testosterone was stopped.  Started GAHT with testosterone gel in 2022. Was on testo gel 2 pumps daily, was not taking daily for a little bit  after mom passed away in september from metastatic breast cancer. Decided to pause GAHT after mom  in 2022, felt like changes were too quick. Following closely with therapist now. As of 2023 was ready to resume testosterone gel. Resumed testosterone gel 1.62% gel in 2023  Current regimen: doing well, using 3 pumps gel daily has not noticed major difference; has noticed more voice deepening and voice changing  interested in seeing more changes, wants to increase dosing  might want to consider injections but not yet   Age-appropriate cancer screening: Last pap smear: 2021 normal Last mammography: s/p bilateral mastectomy due to BRCA+ Last Colonoscopy: n/a   FHx: Reports metastatic breast cancer in mom; denies VTE, CAD/CVA, colon cancer   SH: No cigarettes. Social etoh. Denies drug use Sees therapist weekly, sees psychiatrist q2 months. Supportive friends  works at [employer]  reapplied to [employer] for spring 2024- [facility]

## 2024-09-24 NOTE — ASSESSMENT
[FreeTextEntry1] : Ross is a 28 yo genderqueer AFAB person (they/them) with PMH of JUJU, depression here for follow up of gender affirming hormone therapy  #Gender dysphoria in adult: - Previously discussed options for gender-affirming hormone therapy, goals, risks (erythrocytosis, acne, male pattern hair loss, infertility) and benefits of therapy, as well as options for fertility preservation (cryopreservation of oocytes/embryos), and surgical intervention. Referral provided for fertility preservation in the past. Pt not currently interested in fertility preservation. -Expected timeline for changes to occur (cessation of menses, deep voice, increase in muscle bulk, acne, increased libido) in 3-6 months, longer term changes include male pattern hair loss, clitoral enlargement. -Counseled that pregnancy can still occur on GAHT and that patient must avoid pregnancy while taking GAHT - Discussed goals with patient including aiming for middle range of cismale testosterone (500-700 ng/dL) vs low range based on patient's goals and need to check level q3 months during first year of therapy (then q6-12 months on stable dose)  RX: - testosterone gel 1.62% gel 3 pumps daily   LABS: check labs today  HEALTH MAINTENANCE: - pap 2/2021 - BRCA+, s/p bilateral mastectomy  Previously discussed that there is a lack of long-term population data on the risks of GAHT in BRCA+ transmasculine patients. Overall limited data. There are case reports describing ovarian/endometrial cancer risks - it is possible that androgens through reducing endometrial proliferation/ovulation can lead to lower rates of endometrial/ovarian cancer. For patients who had underwent risk-reducing mastectomy, pt's require yearly clinical exams and serial monitoring if no salpingo-oophorectomy (via transvaginal vs. transrectal US).  Discussed plan with GYN Dr. Casey previously - given limited evidence on testosterone use in pt's with BRCA hx, and with hx of pcos, will continue OCP on or off testosterone.  Mecca Chakraborty MD MSCI; Eliu Sorto MD MPH; Armando Lindsey MD; Williams Pineda MD Creating Breast and Gynecologic Cancer Guidelines for Transgender Patients With BRCA Mutations, Obstetrics & Gynecology: December 2021 - Volume 138 - Issue 6 - p 911-917 doi: [email or web address]/[id number]  #PCOS: evidence of biochemical hyperandrogenism when off ocp - elevated androstenedione, DHEAS (no clinical concern for cushings). DHEAS noted to be 496 off ocp, suspected to be due to PCOS. As level was <500 no adrenal imaging warranted - no further workup today  #Vit d deficiency -vit d replete in 10/2021 - continue OTC vit d  #b12 deficiency - check levels - used to get injections with pcp  #weight gain - reports hx of disordered eating in past, following closely with therapist - provided some resources for SW/therapy specializing in this - discussed importance of healthy eating, avoiding restrictive patterns  flu shot today lot [id number] 6/30/25 ndc [id number]  RTC 3 months  pt contact: [phone].

## 2024-09-24 NOTE — ASSESSMENT
[FreeTextEntry1] : Ross is a 30 yo genderqueer AFAB person (they/them) with PMH of JUJU, depression here for follow up of gender affirming hormone therapy  #Gender dysphoria in adult: - Previously discussed options for gender-affirming hormone therapy, goals, risks (erythrocytosis, acne, male pattern hair loss, infertility) and benefits of therapy, as well as options for fertility preservation (cryopreservation of oocytes/embryos), and surgical intervention. Referral provided for fertility preservation in the past. Pt not currently interested in fertility preservation. -Expected timeline for changes to occur (cessation of menses, deep voice, increase in muscle bulk, acne, increased libido) in 3-6 months, longer term changes include male pattern hair loss, clitoral enlargement. -Counseled that pregnancy can still occur on GAHT and that patient must avoid pregnancy while taking GAHT - Discussed goals with patient including aiming for middle range of cismale testosterone (500-700 ng/dL) vs low range based on patient's goals and need to check level q3 months during first year of therapy (then q6-12 months on stable dose)  RX: - testosterone gel 1.62% gel 3 pumps daily   LABS: check labs today  HEALTH MAINTENANCE: - pap 2/2021 - BRCA+, s/p bilateral mastectomy  Previously discussed that there is a lack of long-term population data on the risks of GAHT in BRCA+ transmasculine patients. Overall limited data. There are case reports describing ovarian/endometrial cancer risks - it is possible that androgens through reducing endometrial proliferation/ovulation can lead to lower rates of endometrial/ovarian cancer. For patients who had underwent risk-reducing mastectomy, pt's require yearly clinical exams and serial monitoring if no salpingo-oophorectomy (via transvaginal vs. transrectal US).  Discussed plan with GYN Dr. Casey previously - given limited evidence on testosterone use in pt's with BRCA hx, and with hx of pcos, will continue OCP on or off testosterone.  Mecca Chakraborty MD MSCI; Eliu Sorto MD MPH; Armando Lindsey MD; Williams Pineda MD Creating Breast and Gynecologic Cancer Guidelines for Transgender Patients With BRCA Mutations, Obstetrics & Gynecology: December 2021 - Volume 138 - Issue 6 - p 911-917 doi: [email or web address]/[id number]  #PCOS: evidence of biochemical hyperandrogenism when off ocp - elevated androstenedione, DHEAS (no clinical concern for cushings). DHEAS noted to be 496 off ocp, suspected to be due to PCOS. As level was <500 no adrenal imaging warranted - no further workup today  #Vit d deficiency -vit d replete in 10/2021 - continue OTC vit d  #b12 deficiency - check levels - used to get injections with pcp  #weight gain - reports hx of disordered eating in past, following closely with therapist - provided some resources for SW/therapy specializing in this - discussed importance of healthy eating, avoiding restrictive patterns  flu shot today lot [id number] 6/30/25 ndc 29499-834-30  RTC 3 months  pt contact: [phone].

## 2024-09-24 NOTE — HISTORY OF PRESENT ILLNESS
[FreeTextEntry1] : CC: Gender affirming hormone therapy HPI: Ross is a 28 yo genderqueer, nonbinary AFAB person (they/them) with PMH of UJJU, depression, PCOS, +BRCA  here for gender affirming hormone follow up.  Transition history: as per visit with NP Ilenaa Ley "Patient states that they came out in 2016 as agender but was unsure if that was the correct feeling. I few years later felt that they identified as non-binary. Had double mastectomy 3/2021 due to BRCA gene inheritance and felt like this was affirming and right after having a mastectomy. Has never been on GHT, no other medical interventions. Has transitioned socially by using preferred name and neutral pronouns. Feels supported by most family and friends are accepting." Mother passed away from metastatic breast cancer.  Menstrual hx:  Menarche: 12-12 yo Regular, then when on OCP since high school. On Junel; Mom had PCOS.  Off ocp with evidence of biochemical hyperandrogenism  - elevated androstenedione, DHEAS (no clinical concern for cushings). DHEAS noted to be 496 off ocp, suspected to be due to PCOS. As level was <500 no adrenal imaging warranted Regular menses   Dysphoria from midsection/hips, voice, face.  Pt stopped OCP>8 weeks and baseline hormones levels assessed prior to GAHT.  Started on GAHT (testosterone 1 pump gel daily) in 2022. On GAHT, reported slight deepening of voice and increase in facial hair.  Per recommendation of GYN, pt took provera to induce period 21 days ago and resumed OCP thereafter given concern for increase risk of cancer without menses per pt per GYN. States period was light, lasted one day. Back on OCP.  Reports feels ok with continuing to have menses and that it is not a major cause of dysphoria for them. Discussed this with Gyn who prefers to keep pt on OCP which pt agrees to. Used to bind prior to mastectomy, no packing No future surgeries planned Interested possibly in biological children. Previously received referrals for fertility preservation, currently not interested. Has concerns about BRCA and testosterone, as well as ability to carry pregnancy if they changed their mind later, and how hormone levels would be affected if testosterone was stopped.  Started GAHT with testosterone gel in 2022. Was on testo gel 2 pumps daily, was not taking daily for a little bit  after mom passed away in september from metastatic breast cancer. Decided to pause GAHT after mom  in 2022, felt like changes were too quick. Following closely with therapist now. As of 2023 was ready to resume testosterone gel. Resumed testosterone gel 1.62% gel in 2023  Current regimen: doing well, using 3 pumps gel daily has not noticed major difference; has noticed more voice deepening and voice changing  interested in seeing more changes, wants to increase dosing  might want to consider injections but not yet   Age-appropriate cancer screening: Last pap smear: 2021 normal Last mammography: s/p bilateral mastectomy due to BRCA+ Last Colonoscopy: n/a   FHx: Reports metastatic breast cancer in mom; denies VTE, CAD/CVA, colon cancer   SH: No cigarettes. Social etoh. Denies drug use Sees therapist weekly, sees psychiatrist q2 months. Supportive friends  works at [employer]  reapplied to [employer] for spring 2024- [facility]

## 2024-09-27 ENCOUNTER — TRANSCRIPTION ENCOUNTER (OUTPATIENT)
Age: 29
End: 2024-09-27

## 2024-09-27 LAB
25(OH)D3 SERPL-MCNC: 40.8 NG/ML
ALBUMIN SERPL ELPH-MCNC: 4.5 G/DL
ALP BLD-CCNC: 62 U/L
ALT SERPL-CCNC: 12 U/L
ANION GAP SERPL CALC-SCNC: 11 MMOL/L
AST SERPL-CCNC: 17 U/L
BASOPHILS # BLD AUTO: 0.08 K/UL
BASOPHILS NFR BLD AUTO: 1.6 %
BILIRUB SERPL-MCNC: 0.5 MG/DL
BUN SERPL-MCNC: 11 MG/DL
CALCIUM SERPL-MCNC: 9.6 MG/DL
CHLORIDE SERPL-SCNC: 105 MMOL/L
CO2 SERPL-SCNC: 24 MMOL/L
CREAT SERPL-MCNC: 0.93 MG/DL
EGFR: 86 ML/MIN/1.73M2
EOSINOPHIL # BLD AUTO: 0.13 K/UL
EOSINOPHIL NFR BLD AUTO: 2.6 %
ESTRADIOL SERPL-MCNC: 16 PG/ML
GLUCOSE SERPL-MCNC: 90 MG/DL
HCT VFR BLD CALC: 42.4 %
HGB BLD-MCNC: 13.7 G/DL
IMM GRANULOCYTES NFR BLD AUTO: 0.2 %
LYMPHOCYTES # BLD AUTO: 2.41 K/UL
LYMPHOCYTES NFR BLD AUTO: 47.6 %
MAN DIFF?: NORMAL
MCHC RBC-ENTMCNC: 27.6 PG
MCHC RBC-ENTMCNC: 32.3 GM/DL
MCV RBC AUTO: 85.3 FL
MONOCYTES # BLD AUTO: 0.36 K/UL
MONOCYTES NFR BLD AUTO: 7.1 %
NEUTROPHILS # BLD AUTO: 2.07 K/UL
NEUTROPHILS NFR BLD AUTO: 40.9 %
PLATELET # BLD AUTO: 350 K/UL
POTASSIUM SERPL-SCNC: 4.4 MMOL/L
PROT SERPL-MCNC: 6.8 G/DL
RBC # BLD: 4.97 M/UL
RBC # FLD: 13.9 %
SODIUM SERPL-SCNC: 140 MMOL/L
TESTOST SERPL-MCNC: 219 NG/DL
VIT B12 SERPL-MCNC: 372 PG/ML
WBC # FLD AUTO: 5.06 K/UL

## 2024-10-09 ENCOUNTER — APPOINTMENT (OUTPATIENT)
Dept: PSYCHIATRY | Facility: CLINIC | Age: 29
End: 2024-10-09

## 2024-10-25 ENCOUNTER — TRANSCRIPTION ENCOUNTER (OUTPATIENT)
Age: 29
End: 2024-10-25

## 2024-11-04 ENCOUNTER — APPOINTMENT (OUTPATIENT)
Dept: PSYCHIATRY | Facility: CLINIC | Age: 29
End: 2024-11-04
Payer: COMMERCIAL

## 2024-11-04 DIAGNOSIS — F64.0 TRANSSEXUALISM: ICD-10-CM

## 2024-11-04 DIAGNOSIS — F41.9 ANXIETY DISORDER, UNSPECIFIED: ICD-10-CM

## 2024-11-04 DIAGNOSIS — F33.9 MAJOR DEPRESSIVE DISORDER, RECURRENT, UNSPECIFIED: ICD-10-CM

## 2024-11-04 DIAGNOSIS — G47.00 INSOMNIA, UNSPECIFIED: ICD-10-CM

## 2024-11-04 PROCEDURE — 99214 OFFICE O/P EST MOD 30 MIN: CPT | Mod: 95

## 2024-11-04 PROCEDURE — G2211 COMPLEX E/M VISIT ADD ON: CPT | Mod: NC,95

## 2024-11-27 ENCOUNTER — TRANSCRIPTION ENCOUNTER (OUTPATIENT)
Age: 29
End: 2024-11-27

## 2024-11-27 ENCOUNTER — APPOINTMENT (OUTPATIENT)
Dept: PSYCHIATRY | Facility: CLINIC | Age: 29
End: 2024-11-27
Payer: COMMERCIAL

## 2024-11-27 DIAGNOSIS — F41.9 ANXIETY DISORDER, UNSPECIFIED: ICD-10-CM

## 2024-11-27 DIAGNOSIS — G47.00 INSOMNIA, UNSPECIFIED: ICD-10-CM

## 2024-11-27 DIAGNOSIS — F33.9 MAJOR DEPRESSIVE DISORDER, RECURRENT, UNSPECIFIED: ICD-10-CM

## 2024-11-27 DIAGNOSIS — F64.0 TRANSSEXUALISM: ICD-10-CM

## 2024-11-27 PROCEDURE — G2211 COMPLEX E/M VISIT ADD ON: CPT | Mod: NC,95

## 2024-11-27 PROCEDURE — 99214 OFFICE O/P EST MOD 30 MIN: CPT | Mod: 95

## 2024-11-27 RX ORDER — BUPROPION HYDROCHLORIDE 300 MG/1
300 TABLET, EXTENDED RELEASE ORAL
Qty: 30 | Refills: 1 | Status: ACTIVE | COMMUNITY
Start: 2024-11-27 | End: 1900-01-01

## 2025-01-15 ENCOUNTER — APPOINTMENT (OUTPATIENT)
Dept: PSYCHIATRY | Facility: CLINIC | Age: 30
End: 2025-01-15
Payer: SELF-PAY

## 2025-01-15 DIAGNOSIS — G47.00 INSOMNIA, UNSPECIFIED: ICD-10-CM

## 2025-01-15 DIAGNOSIS — F64.0 TRANSSEXUALISM: ICD-10-CM

## 2025-01-15 DIAGNOSIS — F33.9 MAJOR DEPRESSIVE DISORDER, RECURRENT, UNSPECIFIED: ICD-10-CM

## 2025-01-15 DIAGNOSIS — F41.9 ANXIETY DISORDER, UNSPECIFIED: ICD-10-CM

## 2025-01-15 PROCEDURE — 99214 OFFICE O/P EST MOD 30 MIN: CPT | Mod: 95

## 2025-01-31 ENCOUNTER — APPOINTMENT (OUTPATIENT)
Dept: TRANSGENDER CARE | Facility: CLINIC | Age: 30
End: 2025-01-31

## 2025-05-09 ENCOUNTER — APPOINTMENT (OUTPATIENT)
Dept: TRANSGENDER CARE | Facility: CLINIC | Age: 30
End: 2025-05-09

## 2025-05-09 VITALS
OXYGEN SATURATION: 98 % | BODY MASS INDEX: 25.52 KG/M2 | SYSTOLIC BLOOD PRESSURE: 104 MMHG | HEART RATE: 85 BPM | TEMPERATURE: 98.4 F | DIASTOLIC BLOOD PRESSURE: 66 MMHG | WEIGHT: 144 LBS | RESPIRATION RATE: 16 BRPM | HEIGHT: 63 IN

## 2025-05-09 DIAGNOSIS — R79.89 OTHER SPECIFIED ABNORMAL FINDINGS OF BLOOD CHEMISTRY: ICD-10-CM

## 2025-05-09 DIAGNOSIS — E55.9 VITAMIN D DEFICIENCY, UNSPECIFIED: ICD-10-CM

## 2025-05-09 DIAGNOSIS — F64.0 TRANSSEXUALISM: ICD-10-CM

## 2025-05-09 PROCEDURE — 99214 OFFICE O/P EST MOD 30 MIN: CPT

## 2025-05-09 PROCEDURE — G2211 COMPLEX E/M VISIT ADD ON: CPT | Mod: NC

## 2025-06-17 ENCOUNTER — TRANSCRIPTION ENCOUNTER (OUTPATIENT)
Age: 30
End: 2025-06-17

## 2025-07-23 ENCOUNTER — TRANSCRIPTION ENCOUNTER (OUTPATIENT)
Age: 30
End: 2025-07-23

## 2025-08-25 ENCOUNTER — TRANSCRIPTION ENCOUNTER (OUTPATIENT)
Age: 30
End: 2025-08-25

## 2025-09-12 ENCOUNTER — APPOINTMENT (OUTPATIENT)
Dept: TRANSGENDER CARE | Facility: CLINIC | Age: 30
End: 2025-09-12

## 2025-09-12 VITALS
HEIGHT: 63 IN | BODY MASS INDEX: 24.98 KG/M2 | HEART RATE: 87 BPM | TEMPERATURE: 98.5 F | DIASTOLIC BLOOD PRESSURE: 88 MMHG | WEIGHT: 141 LBS | SYSTOLIC BLOOD PRESSURE: 116 MMHG | OXYGEN SATURATION: 97 %

## 2025-09-12 DIAGNOSIS — E55.9 VITAMIN D DEFICIENCY, UNSPECIFIED: ICD-10-CM

## 2025-09-12 DIAGNOSIS — L64.9 ANDROGENIC ALOPECIA, UNSPECIFIED: ICD-10-CM

## 2025-09-12 DIAGNOSIS — F64.0 TRANSSEXUALISM: ICD-10-CM

## 2025-09-12 PROCEDURE — G2211 COMPLEX E/M VISIT ADD ON: CPT | Mod: NC

## 2025-09-12 PROCEDURE — 99214 OFFICE O/P EST MOD 30 MIN: CPT

## 2025-09-12 RX ORDER — ISOPROPYL ALCOHOL 70 ML/100ML
SWAB TOPICAL
Qty: 1 | Refills: 3 | Status: ACTIVE | COMMUNITY
Start: 2025-09-12 | End: 1900-01-01

## 2025-09-12 RX ORDER — FINASTERIDE 1 MG/1
1 TABLET ORAL DAILY
Qty: 90 | Refills: 1 | Status: ACTIVE | COMMUNITY
Start: 2025-09-12 | End: 1900-01-01

## 2025-09-12 RX ORDER — SYRINGE, DISPOSABLE, 1 ML
1 ML SYRINGE, EMPTY DISPOSABLE MISCELLANEOUS
Qty: 20 | Refills: 3 | Status: ACTIVE | COMMUNITY
Start: 2025-09-12 | End: 1900-01-01

## 2025-09-16 ENCOUNTER — TRANSCRIPTION ENCOUNTER (OUTPATIENT)
Age: 30
End: 2025-09-16

## 2025-09-16 RX ORDER — TESTOSTERONE CYPIONATE 200 MG/ML
200 INJECTION, SOLUTION INTRAMUSCULAR
Qty: 12 | Refills: 0 | Status: ACTIVE | COMMUNITY
Start: 2025-09-12 | End: 1900-01-01

## 2025-09-18 ENCOUNTER — TRANSCRIPTION ENCOUNTER (OUTPATIENT)
Age: 30
End: 2025-09-18